# Patient Record
Sex: MALE | Race: WHITE | Employment: UNEMPLOYED | URBAN - METROPOLITAN AREA
[De-identification: names, ages, dates, MRNs, and addresses within clinical notes are randomized per-mention and may not be internally consistent; named-entity substitution may affect disease eponyms.]

---

## 2017-01-16 ENCOUNTER — TELEPHONE (OUTPATIENT)
Dept: CARDIOLOGY CLINIC | Age: 52
End: 2017-01-16

## 2017-04-10 ENCOUNTER — OFFICE VISIT (OUTPATIENT)
Dept: ORTHOPEDIC SURGERY | Age: 52
End: 2017-04-10

## 2017-04-10 VITALS — WEIGHT: 250 LBS | HEIGHT: 62 IN | BODY MASS INDEX: 46.01 KG/M2 | RESPIRATION RATE: 16 BRPM

## 2017-04-10 DIAGNOSIS — M17.11 PRIMARY OSTEOARTHRITIS OF RIGHT KNEE: ICD-10-CM

## 2017-04-10 DIAGNOSIS — M17.12 PRIMARY OSTEOARTHRITIS OF LEFT KNEE: ICD-10-CM

## 2017-04-10 DIAGNOSIS — R52 PAIN: Primary | ICD-10-CM

## 2017-04-10 PROCEDURE — 99215 OFFICE O/P EST HI 40 MIN: CPT | Performed by: ORTHOPAEDIC SURGERY

## 2017-04-10 PROCEDURE — 20610 DRAIN/INJ JOINT/BURSA W/O US: CPT | Performed by: ORTHOPAEDIC SURGERY

## 2017-04-10 ASSESSMENT — ENCOUNTER SYMPTOMS
RESPIRATORY NEGATIVE: 1
GASTROINTESTINAL NEGATIVE: 1
EYES NEGATIVE: 1

## 2017-04-26 PROBLEM — N39.0 URINARY TRACT INFECTION: Status: ACTIVE | Noted: 2017-04-26

## 2017-05-11 ENCOUNTER — OFFICE VISIT (OUTPATIENT)
Dept: ORTHOPEDIC SURGERY | Age: 52
End: 2017-05-11

## 2017-05-11 VITALS
SYSTOLIC BLOOD PRESSURE: 94 MMHG | HEART RATE: 102 BPM | WEIGHT: 240 LBS | DIASTOLIC BLOOD PRESSURE: 67 MMHG | HEIGHT: 63 IN | BODY MASS INDEX: 42.52 KG/M2

## 2017-05-11 DIAGNOSIS — R46.0 POOR HYGIENE: ICD-10-CM

## 2017-05-11 DIAGNOSIS — M17.12 PRIMARY OSTEOARTHRITIS OF LEFT KNEE: ICD-10-CM

## 2017-05-11 DIAGNOSIS — M17.11 PRIMARY OSTEOARTHRITIS OF RIGHT KNEE: ICD-10-CM

## 2017-05-11 DIAGNOSIS — M25.562 PAIN IN BOTH KNEES, UNSPECIFIED CHRONICITY: Primary | ICD-10-CM

## 2017-05-11 DIAGNOSIS — M25.561 PAIN IN BOTH KNEES, UNSPECIFIED CHRONICITY: Primary | ICD-10-CM

## 2017-05-11 PROCEDURE — 99243 OFF/OP CNSLTJ NEW/EST LOW 30: CPT | Performed by: ORTHOPAEDIC SURGERY

## 2017-05-11 PROCEDURE — 73564 X-RAY EXAM KNEE 4 OR MORE: CPT | Performed by: ORTHOPAEDIC SURGERY

## 2017-05-15 ENCOUNTER — TELEPHONE (OUTPATIENT)
Dept: CARDIOLOGY CLINIC | Age: 52
End: 2017-05-15

## 2017-05-16 ENCOUNTER — TELEPHONE (OUTPATIENT)
Dept: ORTHOPEDIC SURGERY | Age: 52
End: 2017-05-16

## 2017-05-16 ENCOUNTER — HOSPITAL ENCOUNTER (OUTPATIENT)
Dept: LAB | Age: 52
Discharge: HOME | End: 2017-05-16

## 2017-05-16 LAB
ALBUMIN SERPL-MCNC: 3.7 GM/DL (ref 3.4–5)
ANION GAP SERPL CALCULATED.3IONS-SCNC: 17 MMOL/L (ref 4–16)
APTT: 23.5 SECONDS (ref 21.2–33)
BACTERIA: NEGATIVE /HPF
BASOPHILS ABSOLUTE: 0.1 K/CU MM
BASOPHILS RELATIVE PERCENT: 0.8 % (ref 0–1)
BILIRUBIN URINE: NEGATIVE MG/DL
BLOOD, URINE: NEGATIVE
BUN BLDV-MCNC: 7 MG/DL (ref 6–23)
CALCIUM SERPL-MCNC: 9.5 MG/DL (ref 8.3–10.6)
CHLORIDE BLD-SCNC: 95 MMOL/L (ref 99–110)
CLARITY: ABNORMAL
CO2: 24 MMOL/L (ref 21–32)
COLOR: ABNORMAL
CREAT SERPL-MCNC: 0.7 MG/DL (ref 0.9–1.3)
DIFFERENTIAL TYPE: ABNORMAL
EOSINOPHILS ABSOLUTE: 0.1 K/CU MM
EOSINOPHILS RELATIVE PERCENT: 1.8 % (ref 0–3)
GFR AFRICAN AMERICAN: >60 ML/MIN/1.73M2
GFR NON-AFRICAN AMERICAN: >60 ML/MIN/1.73M2
GLUCOSE FASTING: 552 MG/DL (ref 70–99)
GLUCOSE, URINE: >500 MG/DL
HCT VFR BLD CALC: 40.5 % (ref 42–52)
HEMOGLOBIN: 13.2 GM/DL (ref 13.5–18)
IMMATURE NEUTROPHIL %: 1 % (ref 0–0.43)
INR BLD: 0.85 INDEX
KETONES, URINE: NEGATIVE MG/DL
LEUKOCYTE ESTERASE, URINE: ABNORMAL
LYMPHOCYTES ABSOLUTE: 2.7 K/CU MM
LYMPHOCYTES RELATIVE PERCENT: 34.4 % (ref 24–44)
MCH RBC QN AUTO: 26.6 PG (ref 27–31)
MCHC RBC AUTO-ENTMCNC: 32.6 % (ref 32–36)
MCV RBC AUTO: 81.5 FL (ref 78–100)
MONOCYTES ABSOLUTE: 0.5 K/CU MM
MONOCYTES RELATIVE PERCENT: 6.1 % (ref 0–4)
NITRITE URINE, QUANTITATIVE: NEGATIVE
NUCLEATED RBC %: 0 %
PDW BLD-RTO: 13.3 % (ref 11.7–14.9)
PH, URINE: 6 (ref 5–8)
PLATELET # BLD: 388 K/CU MM (ref 140–440)
PMV BLD AUTO: 9.4 FL (ref 7.5–11.1)
POTASSIUM SERPL-SCNC: 3.8 MMOL/L (ref 3.5–5.1)
PROTEIN UA: NEGATIVE MG/DL
PROTHROMBIN TIME: 9.6 SECONDS (ref 9.12–12.5)
RBC # BLD: 4.97 M/CU MM (ref 4.6–6.2)
RBC URINE: 3 /HPF (ref 0–3)
SEGMENTED NEUTROPHILS ABSOLUTE COUNT: 4.4 K/CU MM
SEGMENTED NEUTROPHILS RELATIVE PERCENT: 55.9 % (ref 36–66)
SODIUM BLD-SCNC: 136 MMOL/L (ref 135–145)
SPECIFIC GRAVITY UA: 1.02 (ref 1–1.03)
SQUAMOUS EPITHELIAL: 2 /HPF
TOTAL IMMATURE NEUTOROPHIL: 0.08 K/CU MM
TOTAL NUCLEATED RBC: 0 K/CU MM
TRANSFERRIN: 225.8 MG/DL (ref 200–360)
UROBILINOGEN, URINE: NORMAL MG/DL (ref 0.2–1)
WBC # BLD: 7.9 K/CU MM (ref 4–10.5)
WBC CLUMP: ABNORMAL /HPF
WBC UA: 133 /HPF (ref 0–2)
YEAST: ABNORMAL /HPF

## 2017-05-18 LAB
CULTURE: NORMAL
ESTIMATED AVERAGE GLUCOSE: 395 MG/DL
HBA1C MFR BLD: 15.4 % (ref 4.2–6.3)
ORGANISM: NORMAL
REPORT STATUS: NORMAL
REQUEST PROBLEM: NORMAL
SPECIMEN: NORMAL
TOTAL COLONY COUNT: NORMAL

## 2017-05-24 ENCOUNTER — OFFICE VISIT (OUTPATIENT)
Dept: CARDIOLOGY CLINIC | Age: 52
End: 2017-05-24

## 2017-05-24 VITALS
SYSTOLIC BLOOD PRESSURE: 114 MMHG | HEART RATE: 80 BPM | WEIGHT: 262.6 LBS | HEIGHT: 64 IN | BODY MASS INDEX: 44.83 KG/M2 | DIASTOLIC BLOOD PRESSURE: 76 MMHG

## 2017-05-24 DIAGNOSIS — I10 ESSENTIAL HYPERTENSION: ICD-10-CM

## 2017-05-24 DIAGNOSIS — I21.4 NSTEMI (NON-ST ELEVATED MYOCARDIAL INFARCTION) (HCC): Primary | ICD-10-CM

## 2017-05-24 PROCEDURE — 99214 OFFICE O/P EST MOD 30 MIN: CPT | Performed by: INTERNAL MEDICINE

## 2017-06-08 ENCOUNTER — OFFICE VISIT (OUTPATIENT)
Dept: ORTHOPEDIC SURGERY | Age: 52
End: 2017-06-08

## 2017-06-08 VITALS — HEIGHT: 64 IN | WEIGHT: 262.57 LBS | BODY MASS INDEX: 44.83 KG/M2

## 2017-06-08 DIAGNOSIS — M17.11 PRIMARY OSTEOARTHRITIS OF RIGHT KNEE: ICD-10-CM

## 2017-06-08 DIAGNOSIS — M17.12 PRIMARY OSTEOARTHRITIS OF LEFT KNEE: Primary | ICD-10-CM

## 2017-06-08 PROCEDURE — 99213 OFFICE O/P EST LOW 20 MIN: CPT | Performed by: ORTHOPAEDIC SURGERY

## 2017-06-08 PROCEDURE — L1810 KO ELASTIC WITH JOINTS: HCPCS | Performed by: ORTHOPAEDIC SURGERY

## 2017-07-12 ENCOUNTER — HOSPITAL ENCOUNTER (OUTPATIENT)
Dept: PHYSICAL THERAPY | Age: 52
Discharge: HOME | End: 2017-07-31
Attending: FAMILY MEDICINE | Admitting: FAMILY MEDICINE

## 2017-07-12 ASSESSMENT — PAIN DESCRIPTION - DESCRIPTORS: DESCRIPTORS: ACHING;PRESSURE

## 2017-07-12 ASSESSMENT — PAIN DESCRIPTION - FREQUENCY: FREQUENCY: INTERMITTENT

## 2017-07-12 ASSESSMENT — PAIN DESCRIPTION - ORIENTATION: ORIENTATION: RIGHT;LEFT

## 2017-07-12 ASSESSMENT — PAIN DESCRIPTION - PAIN TYPE: TYPE: CHRONIC PAIN

## 2017-08-01 ENCOUNTER — HOSPITAL ENCOUNTER (OUTPATIENT)
Dept: OTHER | Age: 52
Discharge: HOME | End: 2017-08-17
Attending: FAMILY MEDICINE | Admitting: FAMILY MEDICINE

## 2017-08-21 ENCOUNTER — HOSPITAL ENCOUNTER (OUTPATIENT)
Dept: LAB | Age: 52
Discharge: HOME | End: 2017-08-21
Attending: ORTHOPAEDIC SURGERY | Admitting: ORTHOPAEDIC SURGERY

## 2017-08-21 LAB
ALBUMIN SERPL-MCNC: 3.7 GM/DL (ref 3.4–5)
ANION GAP SERPL CALCULATED.3IONS-SCNC: 15 MMOL/L (ref 4–16)
APTT: 27.1 SECONDS (ref 21.2–33)
BACTERIA: ABNORMAL /HPF
BASOPHILS ABSOLUTE: 0.1 K/CU MM
BASOPHILS RELATIVE PERCENT: 0.5 % (ref 0–1)
BILIRUBIN URINE: NEGATIVE MG/DL
BLOOD, URINE: ABNORMAL
BUN BLDV-MCNC: 11 MG/DL (ref 6–23)
CALCIUM SERPL-MCNC: 8.6 MG/DL (ref 8.3–10.6)
CHLORIDE BLD-SCNC: 102 MMOL/L (ref 99–110)
CLARITY: ABNORMAL
CO2: 23 MMOL/L (ref 21–32)
COLOR: YELLOW
CREAT SERPL-MCNC: 0.6 MG/DL (ref 0.9–1.3)
DIFFERENTIAL TYPE: ABNORMAL
EOSINOPHILS ABSOLUTE: 0.3 K/CU MM
EOSINOPHILS RELATIVE PERCENT: 2.5 % (ref 0–3)
ESTIMATED AVERAGE GLUCOSE: 154 MG/DL
GFR AFRICAN AMERICAN: >60 ML/MIN/1.73M2
GFR NON-AFRICAN AMERICAN: >60 ML/MIN/1.73M2
GLUCOSE BLD-MCNC: 91 MG/DL (ref 70–140)
GLUCOSE, URINE: NEGATIVE MG/DL
HBA1C MFR BLD: 7 % (ref 4.2–6.3)
HCT VFR BLD CALC: 35.2 % (ref 42–52)
HEMOGLOBIN: 11.5 GM/DL (ref 13.5–18)
IMMATURE NEUTROPHIL %: 0.8 % (ref 0–0.43)
INR BLD: 0.99 INDEX
KETONES, URINE: ABNORMAL MG/DL
LEUKOCYTE ESTERASE, URINE: ABNORMAL
LYMPHOCYTES ABSOLUTE: 3.2 K/CU MM
LYMPHOCYTES RELATIVE PERCENT: 30.3 % (ref 24–44)
MCH RBC QN AUTO: 27.5 PG (ref 27–31)
MCHC RBC AUTO-ENTMCNC: 32.7 % (ref 32–36)
MCV RBC AUTO: 84.2 FL (ref 78–100)
MONOCYTES ABSOLUTE: 0.6 K/CU MM
MONOCYTES RELATIVE PERCENT: 5.3 % (ref 0–4)
MUCUS: ABNORMAL HPF
NITRITE URINE, QUANTITATIVE: NEGATIVE
NUCLEATED RBC %: 0 %
PDW BLD-RTO: 14.5 % (ref 11.7–14.9)
PH, URINE: 6 (ref 5–8)
PLATELET # BLD: 339 K/CU MM (ref 140–440)
PMV BLD AUTO: 8.2 FL (ref 7.5–11.1)
POTASSIUM SERPL-SCNC: 3.2 MMOL/L (ref 3.5–5.1)
PROTEIN UA: 30 MG/DL
PROTHROMBIN TIME: 11.3 SECONDS (ref 9.12–12.5)
RBC # BLD: 4.18 M/CU MM (ref 4.6–6.2)
RBC URINE: 3 /HPF (ref 0–3)
SEGMENTED NEUTROPHILS ABSOLUTE COUNT: 6.5 K/CU MM
SEGMENTED NEUTROPHILS RELATIVE PERCENT: 60.6 % (ref 36–66)
SODIUM BLD-SCNC: 140 MMOL/L (ref 135–145)
SPECIFIC GRAVITY UA: 1.02 (ref 1–1.03)
SQUAMOUS EPITHELIAL: 5 /HPF
TOTAL IMMATURE NEUTOROPHIL: 0.09 K/CU MM
TOTAL NUCLEATED RBC: 0 K/CU MM
TRANSFERRIN: 220.5 MG/DL (ref 200–360)
TRICHOMONAS: ABNORMAL /HPF
UROBILINOGEN, URINE: NORMAL MG/DL (ref 0.2–1)
WBC # BLD: 10.7 K/CU MM (ref 4–10.5)
WBC CLUMP: ABNORMAL /HPF
WBC UA: 128 /HPF (ref 0–2)

## 2017-08-24 LAB
CULTURE: NORMAL
ORGANISM: NORMAL
REPORT STATUS: NORMAL
REQUEST PROBLEM: NORMAL
SPECIMEN: NORMAL
TOTAL COLONY COUNT: NORMAL

## 2017-08-25 RX ORDER — AMOXICILLIN AND CLAVULANATE POTASSIUM 875; 125 MG/1; MG/1
TABLET, FILM COATED ORAL
Qty: 20 TABLET | Refills: 0 | Status: ON HOLD | OUTPATIENT
Start: 2017-08-25 | End: 2017-11-27 | Stop reason: HOSPADM

## 2017-10-19 ENCOUNTER — OFFICE VISIT (OUTPATIENT)
Dept: ORTHOPEDIC SURGERY | Age: 52
End: 2017-10-19

## 2017-10-19 VITALS
SYSTOLIC BLOOD PRESSURE: 150 MMHG | HEIGHT: 63 IN | BODY MASS INDEX: 46.07 KG/M2 | HEART RATE: 97 BPM | DIASTOLIC BLOOD PRESSURE: 66 MMHG | WEIGHT: 260 LBS

## 2017-10-19 DIAGNOSIS — M17.12 PRIMARY OSTEOARTHRITIS OF LEFT KNEE: Primary | ICD-10-CM

## 2017-10-19 DIAGNOSIS — M25.562 PAIN IN BOTH KNEES, UNSPECIFIED CHRONICITY: ICD-10-CM

## 2017-10-19 DIAGNOSIS — M25.561 PAIN IN BOTH KNEES, UNSPECIFIED CHRONICITY: ICD-10-CM

## 2017-10-19 DIAGNOSIS — M17.11 PRIMARY OSTEOARTHRITIS OF RIGHT KNEE: ICD-10-CM

## 2017-10-19 PROCEDURE — L1830 KO IMMOB CANVAS LONG PRE OTS: HCPCS | Performed by: PHYSICIAN ASSISTANT

## 2017-10-19 PROCEDURE — 99214 OFFICE O/P EST MOD 30 MIN: CPT | Performed by: PHYSICIAN ASSISTANT

## 2017-10-19 PROCEDURE — G8598 ASA/ANTIPLAT THER USED: HCPCS | Performed by: PHYSICIAN ASSISTANT

## 2017-10-19 PROCEDURE — G8427 DOCREV CUR MEDS BY ELIG CLIN: HCPCS | Performed by: PHYSICIAN ASSISTANT

## 2017-10-19 PROCEDURE — 4004F PT TOBACCO SCREEN RCVD TLK: CPT | Performed by: PHYSICIAN ASSISTANT

## 2017-10-19 PROCEDURE — 3017F COLORECTAL CA SCREEN DOC REV: CPT | Performed by: PHYSICIAN ASSISTANT

## 2017-10-19 PROCEDURE — G8484 FLU IMMUNIZE NO ADMIN: HCPCS | Performed by: PHYSICIAN ASSISTANT

## 2017-10-19 PROCEDURE — G8417 CALC BMI ABV UP PARAM F/U: HCPCS | Performed by: PHYSICIAN ASSISTANT

## 2017-10-19 NOTE — PROGRESS NOTES
Chief Complaint    Knee Pain (bilat knee wants to discuss sx)      History of Present Illness:  Gavi Mark is a 50 y.o. male returns today for follow-up on his bilateral knee osteoarthritis. He has severe osteoarthritis of both knees and had several discussions with this patient seemed regarding the importance of weight loss and diabetic control. He's been working with his pharmacist up in [facility] and is unable to take his hemoglobin A1c from 14.0 down to 7.1. He is also continuing to work on weight loss and has lost approximately 15 pounds. However, he continues to have chronic pain in both knees, left greater than right. Weightbearing activities such as walking and standing increases level pain and discomfort. He is using a walker and wears bilateral hinged knee braces. In addition to be in a poorly controlled diabetic she is also on Plavix for cardiac issues and takes hydrocodone one every 6 hours for chronic pain. The pain does severely affecting his quality of life and cause sleep disturbances at night. PAIN:   Pain Assessment  Location of Pain: Knee  Location Modifiers: Right, Left  Severity of Pain: 8  Frequency of Pain: Constant  Aggravating Factors: Walking, Standing, Stairs, Other (Comment)  Limiting Behavior: Yes  Result of Injury: No  Work-Related Injury: No  Are there other pain locations you wish to document?: No    The patients chronic pain has gradually worsening over the last 5 years. The patient rates pain on a level of 8/10. Pain impacts patients ability to drive, sleep and climb stairs. Medical History:  Patient's medications, allergies, past medical, surgical, social and family histories were reviewed and updated as appropriate. Medication Management  Patient has been treated with NSAIDs, Steroids and Analgesics with Minimal relief for 5 years.     Review of Systems:  Relevant review of systems reviewed and available in the patient's chart    Vital positioning and alignment of anatomical landmarks. 2. Trimming of straps and panels. Reassemble orthosis to specifically fit patient. The patient was educated and fit by a healthcare professional with expert knowledge and specialization in brace application while under the direct supervision of the treating physician. Verbal and written instructions for the use of and application of this item were provided. They were instructed to contact the office immediately should the brace result in increased pain, decreased sensation, increased swelling or worsening of the condition. Treatment Plan: Today we've gone over the patient's diagnosis and the recommendations for treatment. He does have severe osteoarthritis of both knees and has been able to get his diabetes under control. He has tried aggressive conservative treatment as mentioned above. He is going to continue to work with his pharmacist to continue to get his hemoglobin A1c below 7. We will be repeating all of the necessary blood works including the hemoglobin A1c to make sure he is continuing to trend downwards. He would like for him to continue to work on weight loss in the meantime as well. He understands that he still carries a high risk with surgery with his cardiovascular history, diabetes, obesity, and overall poor hygiene. We would recommend proceeding with a left total knee replacement with computer navigation. We discussed the risk, benefits, and potential complications of total knee replacement arthroplasty surgery. The patient voiced their understanding to concerns that include infection, deep vein thrombosis, neurological injury, and delayed rehabilitation. The patient also realizes that there are concerns regarding the potential need for manipulation under anesthesia if range of motion proves to be problematic.  The patient also understands that there is always a chance of dystrophy and anesthetic complications that would

## 2017-10-19 NOTE — LETTER
CONSENT TO SURGICAL OR MEDICAL PROCEDURE    PATIENTS NAMES: Regulo Jones 1965  50 y.o.      [phone] (home)   DATE/TIME: 10/19/2017 1:40 PM    1) I consent that Dr. Caro Rodriguez perform one or more surgical and or medical procedures on the above named patient at: [address] [place] [address]/[place] [address] to treat the condition(s) which appear indicated by the diagnostic studies already preformed. I have been told in general terms the nature and purpose of the procedure(s) and what the procedure(s) is/are expected to accomplish. They procedure(s) are as follows:   LEFT TOTAL KNEE REPLACEMENT WITH COMPUTER NAVIGATION     2) It has been explained to me by the informing physician that during the course of any surgical or medical procedure unforeseen condition(s) may be revealed that necessitate an extension of the original procedure(s) or a different procedure(s) than set forth in Paragraph 1. I therefore consent that the above named physician perform such additional or different procedure(s) as are necessary or desirable in the exercise of his professional judgement. 3) I have been made aware by the informing physician of certain reasonably known risks that are associated with the procedure(s) set forth in Paragraph 1.  I understand the reasonably known risk to be: Including but not limited to: CVA, infection, M.I., Phlebitis, Cardiac Arrest and Pulmonary Embolism, Loss of Circulation, Nerve Injury, Delayed Healing, Recurrence, Loss of extremity/digit, R.S.D., Screw breakage, Arthritis, Pain, Swelling, Stiffness, Failure of Prothesis, Fracture, Leg length discrepancy, Wound complication/non-healing, need for further surgery and persistent pain.   4) I have also been informed by the informing physician that there are other risks, from both known and unknown causes, that are attendant to the

## 2017-10-19 NOTE — LETTER
Attention    These are medical screening labs, not pre-admission surgery labs. Via Tesfaye Saez M.D.  [phone]  [facility], [address] [place] [address]    Date:  10/19/2017    Name: Oswald Stoner    : 1965    Please take this form with you.       THE FOLLOWING LABS NEED TO BE COMPLETED:    _x__UA  _x__URINE C/S (THIS NEEDS TO BE DONE EVEN IF THE UA IS NORMAL)  _x__CBC W/ DIFF  _x__PT/INR  _x__PTT  _x__TRANSFERRIN  _x__ALBUMIN  _x__CHEM 7  _x__HEMAGLOBIN A1-C  ____OTHER: _____________________________________________                         Diagnosis:  LEFT KNEE OSTEOARTHRITIS            RT KNEE OA M17.11  LT KNEE OA M17.12         RT HIP OA  M16.11     LT HIP OA M16.12             Z01.812  (Pre-op examination code)      10/19/2017 1:40 PM  Nesha Avendaño PA-C

## 2017-10-30 ENCOUNTER — HOSPITAL ENCOUNTER (OUTPATIENT)
Dept: LAB | Age: 52
Discharge: HOME | End: 2017-10-30
Attending: PHYSICIAN ASSISTANT | Admitting: PHYSICIAN ASSISTANT

## 2017-10-30 ENCOUNTER — HOSPITAL ENCOUNTER (OUTPATIENT)
Dept: GENERAL RADIOLOGY | Age: 52
Discharge: HOME | End: 2017-10-30
Attending: FAMILY MEDICINE | Admitting: FAMILY MEDICINE

## 2017-10-30 DIAGNOSIS — T14.90XA INHALATION INJURY: ICD-10-CM

## 2017-10-30 LAB
ALBUMIN SERPL-MCNC: 3.9 GM/DL (ref 3.4–5)
ANION GAP SERPL CALCULATED.3IONS-SCNC: 14 MMOL/L (ref 4–16)
APTT: 24.9 SECONDS (ref 21.2–33)
BACTERIA: ABNORMAL /HPF
BASOPHILS ABSOLUTE: 0.1 K/CU MM
BASOPHILS RELATIVE PERCENT: 0.5 % (ref 0–1)
BILIRUBIN URINE: NEGATIVE MG/DL
BLOOD, URINE: ABNORMAL
BUN BLDV-MCNC: 9 MG/DL (ref 6–23)
CALCIUM SERPL-MCNC: 9.1 MG/DL (ref 8.3–10.6)
CHLORIDE BLD-SCNC: 94 MMOL/L (ref 99–110)
CLARITY: ABNORMAL
CO2: 24 MMOL/L (ref 21–32)
COLOR: YELLOW
CREAT SERPL-MCNC: 0.6 MG/DL (ref 0.9–1.3)
DIFFERENTIAL TYPE: ABNORMAL
EOSINOPHILS ABSOLUTE: 0.4 K/CU MM
EOSINOPHILS RELATIVE PERCENT: 3 % (ref 0–3)
ESTIMATED AVERAGE GLUCOSE: 235 MG/DL
GFR AFRICAN AMERICAN: >60 ML/MIN/1.73M2
GFR NON-AFRICAN AMERICAN: >60 ML/MIN/1.73M2
GLUCOSE FASTING: 565 MG/DL (ref 70–99)
GLUCOSE, URINE: >500 MG/DL
HBA1C MFR BLD: 9.8 % (ref 4.2–6.3)
HCT VFR BLD CALC: 41.2 % (ref 42–52)
HEMOGLOBIN: 13.9 GM/DL (ref 13.5–18)
IMMATURE NEUTROPHIL %: 0.6 % (ref 0–0.43)
INR BLD: 0.94 INDEX
KETONES, URINE: NEGATIVE MG/DL
LEUKOCYTE ESTERASE, URINE: ABNORMAL
LYMPHOCYTES ABSOLUTE: 2.8 K/CU MM
LYMPHOCYTES RELATIVE PERCENT: 24.7 % (ref 24–44)
MCH RBC QN AUTO: 27 PG (ref 27–31)
MCHC RBC AUTO-ENTMCNC: 33.7 % (ref 32–36)
MCV RBC AUTO: 80 FL (ref 78–100)
MONOCYTES ABSOLUTE: 0.5 K/CU MM
MONOCYTES RELATIVE PERCENT: 4.5 % (ref 0–4)
NITRITE URINE, QUANTITATIVE: NEGATIVE
NUCLEATED RBC %: 0 %
PDW BLD-RTO: 13.3 % (ref 11.7–14.9)
PH, URINE: 6 (ref 5–8)
PLATELET # BLD: 332 K/CU MM (ref 140–440)
PMV BLD AUTO: 8.5 FL (ref 7.5–11.1)
POTASSIUM SERPL-SCNC: 3.6 MMOL/L (ref 3.5–5.1)
PROTEIN UA: NEGATIVE MG/DL
PROTHROMBIN TIME: 10.7 SECONDS (ref 9.12–12.5)
RBC # BLD: 5.15 M/CU MM (ref 4.6–6.2)
RBC URINE: ABNORMAL /HPF (ref 0–3)
SEGMENTED NEUTROPHILS ABSOLUTE COUNT: 7.7 K/CU MM
SEGMENTED NEUTROPHILS RELATIVE PERCENT: 66.7 % (ref 36–66)
SODIUM BLD-SCNC: 132 MMOL/L (ref 135–145)
SPECIFIC GRAVITY UA: 1.02 (ref 1–1.03)
SQUAMOUS EPITHELIAL: 1 /HPF
TOTAL IMMATURE NEUTOROPHIL: 0.07 K/CU MM
TOTAL NUCLEATED RBC: 0 K/CU MM
TRANSFERRIN: 236 MG/DL (ref 200–360)
TRICHOMONAS: ABNORMAL /HPF
UROBILINOGEN, URINE: NORMAL MG/DL (ref 0.2–1)
WBC # BLD: 11.5 K/CU MM (ref 4–10.5)
WBC CLUMP: ABNORMAL /HPF
WBC UA: 228 /HPF (ref 0–2)
YEAST: ABNORMAL /HPF

## 2017-12-06 ENCOUNTER — TELEPHONE (OUTPATIENT)
Dept: CARDIOLOGY CLINIC | Age: 52
End: 2017-12-06

## 2017-12-11 NOTE — TELEPHONE ENCOUNTER
Returned call to [facility] and spoke with Kathya Singh. Barney is still off of work. Explained to Kathya Singh how to [place] monitor and loop recorder. She voiced understanding and said that she would send transmission once patient returned to his room.

## 2017-12-15 PROBLEM — R53.1 WEAK: Status: ACTIVE | Noted: 2017-12-15

## 2018-08-05 PROBLEM — N39.0 UTI (URINARY TRACT INFECTION) WITH PYURIA: Status: ACTIVE | Noted: 2018-08-05

## 2018-08-05 PROBLEM — Z79.4 TYPE 2 DIABETES MELLITUS WITH HYPERGLYCEMIA, WITH LONG-TERM CURRENT USE OF INSULIN (HCC): Status: ACTIVE | Noted: 2018-08-05
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
## 2018-08-05 PROBLEM — E11.65 TYPE 2 DIABETES MELLITUS WITH HYPERGLYCEMIA, WITH LONG-TERM CURRENT USE OF INSULIN (HCC): Status: ACTIVE | Noted: 2018-08-05

## 2018-08-05 PROBLEM — R79.89 ELEVATED TROPONIN LEVEL: Status: ACTIVE | Noted: 2018-08-05

## 2018-08-05 PROBLEM — R77.8 ELEVATED TROPONIN LEVEL: Status: ACTIVE | Noted: 2018-08-05
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
## 2018-08-15 ENCOUNTER — OFFICE VISIT (OUTPATIENT)
Dept: CARDIOLOGY CLINIC | Age: 53
End: 2018-08-15

## 2018-08-15 VITALS
HEIGHT: 63 IN | HEART RATE: 80 BPM | SYSTOLIC BLOOD PRESSURE: 120 MMHG | DIASTOLIC BLOOD PRESSURE: 70 MMHG | WEIGHT: 315 LBS | BODY MASS INDEX: 55.81 KG/M2

## 2018-08-15 DIAGNOSIS — I73.9 CLAUDICATION (HCC): ICD-10-CM

## 2018-08-15 DIAGNOSIS — I25.10 CORONARY ARTERY DISEASE INVOLVING NATIVE CORONARY ARTERY OF NATIVE HEART WITHOUT ANGINA PECTORIS: Primary | ICD-10-CM

## 2018-08-15 DIAGNOSIS — E78.5 HYPERLIPIDEMIA, UNSPECIFIED HYPERLIPIDEMIA TYPE: ICD-10-CM

## 2018-08-15 DIAGNOSIS — I10 ESSENTIAL HYPERTENSION: ICD-10-CM

## 2018-08-15 DIAGNOSIS — E11.65 TYPE 2 DIABETES MELLITUS WITH HYPERGLYCEMIA, WITH LONG-TERM CURRENT USE OF INSULIN (HCC): ICD-10-CM

## 2018-08-15 DIAGNOSIS — I20.0 ANGINA PECTORIS, UNSTABLE (HCC): ICD-10-CM

## 2018-08-15 DIAGNOSIS — Z79.4 TYPE 2 DIABETES MELLITUS WITH HYPERGLYCEMIA, WITH LONG-TERM CURRENT USE OF INSULIN (HCC): ICD-10-CM

## 2018-08-15 PROCEDURE — 99214 OFFICE O/P EST MOD 30 MIN: CPT | Performed by: NURSE PRACTITIONER

## 2018-08-15 NOTE — PROGRESS NOTES
CARDIOLOGY  NOTE      8/15/2018    RE: Jessie Tate  (1965)                               TO:  Dr. Bernadine Garcia MD  The primary cardiologist is     Thank you for involving me in taking care of your  patient Jessie Tate, who is a  50y.o. year old male with past medical  history of Angina, CAD, CVA, HTN, Claudication, hyperlipidemia, and DM. He is seen today for a follow up from [facility] where it was noted that he had elevated troponin. He during this visit he denies any chest pain. He denies any sob at this time. He is sitting in a wheelchair today. He notes he is fatigued and not sleeping well.      Vitals:    08/15/18 1022   BP: 120/70   Pulse: 80       Current Outpatient Prescriptions   Medication Sig Dispense Refill    apixaban (ELIQUIS) 5 MG TABS tablet Take by mouth 2 times daily      aspirin 81 MG chewable tablet Take 4 tablets by mouth daily 30 tablet 3    phenytoin (PHENYTEK) 300 MG ER capsule Take 1 capsule by mouth daily (Patient taking differently: Take 600 mg by mouth daily ) 60 capsule 3    insulin aspart (NOVOLOG) 100 UNIT/ML injection vial Inject into the skin 3 times daily (before meals)      oxybutynin (DITROPAN) 5 MG tablet Take 5 mg by mouth 3 times daily      metoprolol succinate (TOPROL XL) 25 MG extended release tablet Take 25 mg by mouth daily      acetaminophen (TYLENOL) 325 MG tablet Take 2 tablets by mouth every 4 hours as needed for Pain 120 tablet 3    FLUoxetine (PROZAC) 20 MG capsule Take 1 capsule by mouth daily 30 capsule 3    insulin glargine (LANTUS) 100 UNIT/ML injection vial Inject 10 Units into the skin nightly 1 vial 3    folic acid-pyridoxine-cyancobalamin (FOLTX) 1.13-25-2 MG TABS Take 1 tablet by mouth daily 30 tablet     docusate (COLACE, DULCOLAX) 100 MG CAPS Take 100 mg by mouth 2 times daily      ranitidine (ZANTAC) 150 MG capsule Take 150 mg by mouth 2 times daily      cyclobenzaprine (FLEXERIL) 10 MG tablet Take 10 mg by mouth 3 times daily as needed for Muscle spasms      lisinopril (PRINIVIL;ZESTRIL) 10 MG tablet Take 10 mg by mouth daily      metFORMIN (GLUCOPHAGE) 1000 MG tablet TAKE 1 TABLET BY MOUTH 2 TIMES DAILY (WITH MEALS) 60 tablet 3    atorvastatin (LIPITOR) 80 MG tablet Take 1 tablet by mouth nightly 30 tablet 6    nitroGLYCERIN (NITROSTAT) 0.4 MG SL tablet Place 1 tablet under the tongue every 5 minutes as needed for Chest pain. 25 tablet 0     No current facility-administered medications for this visit. Allergies: Latex  Past Medical History:   Diagnosis Date    CAD (coronary artery disease) 3/19/2014    Diabetes mellitus ([facility]     New diagnosis 3/19/14    Family history of early CAD     Father-age 42's    H/O cardiovascular stress test 5/9/2014    EF 62% normal study    H/O echocardiogram 7/28/15    EF 55-60%. Mild mitral and tricuspid insuff.  H/O echocardiogram 05/09/2018    EF40-50% mild phtn, , TR    History of cardiac catheterization 3/19/2014    3/2014-LAD 99% prox,LAD 80% mid,CX dominant with OM1 50% prox, PDA 80%, RCA 80% mid-PTCA with 1 stent to LAD prox and 2 stents to LAD mid. CX stent in 2nd sitting as OP-Dr Alvin Reilly    History of echocardiogram 3/19/2014    3/2014-Mild LVH. Normal global and regional LVSF. EF 60%.  Mild MR/TR;    Hx of Doppler ultrasound 5/09/14    Peripheral- Normal    Hyperlipemia     Loss of consciousness ([facility] 3/18/2014    Multiple lacunar infarcts (HCC)     chronic-basal ganglia region bilaterally    NSTEMI (non-ST elevated myocardial infarction) ([facility] 3/19/2014    S/P primary angioplasty with coronary stent 3/19/2014    3/2014-PTCA with 3 stents to LAD;    Seizures [facility])      Past Surgical History:   Procedure Laterality Date    CORONARY ANGIOPLASTY WITH STENT PLACEMENT  3/19/2014    3/2014-PTCA with 1 stent to LAD prox and 2 stents to LAD mid- Dr Yousif Vázquez

## 2018-09-01 PROBLEM — N20.0 STAGHORN CALCULUS: Status: ACTIVE | Noted: 2018-09-01

## 2018-09-01 PROBLEM — N20.0 STAGHORN RENAL CALCULUS: Status: ACTIVE | Noted: 2018-09-01

## 2018-09-06 ENCOUNTER — TELEPHONE (OUTPATIENT)
Dept: CARDIOLOGY CLINIC | Age: 53
End: 2018-09-06

## 2018-09-06 NOTE — TELEPHONE ENCOUNTER
[address] [facility] calling to schedule appt with Dr Rylee Espinal. I explained the protocol of Vivienne Hernandes calling them with the appointment date and time. They verbalized their understanding. They can be reached at [phone] as he is a resident at [facility] currently.

## 2018-09-13 ENCOUNTER — TELEPHONE (OUTPATIENT)
Dept: CARDIOLOGY CLINIC | Age: 53
End: 2018-09-13

## 2018-09-26 PROBLEM — N39.0 URINARY TRACT INFECTION: Status: RESOLVED | Noted: 2017-04-26 | Resolved: 2018-09-26

## 2018-11-19 ENCOUNTER — APPOINTMENT (OUTPATIENT)
Dept: CT IMAGING | Age: 53
End: 2018-11-19
Payer: MEDICAID

## 2018-11-19 ENCOUNTER — HOSPITAL ENCOUNTER (EMERGENCY)
Age: 53
Discharge: HOME | End: 2018-11-19
Payer: MEDICAID

## 2018-11-19 VITALS
OXYGEN SATURATION: 97 % | RESPIRATION RATE: 18 BRPM | HEIGHT: 63 IN | SYSTOLIC BLOOD PRESSURE: 138 MMHG | TEMPERATURE: 99.4 F | HEART RATE: 76 BPM | DIASTOLIC BLOOD PRESSURE: 86 MMHG | WEIGHT: 315 LBS | BODY MASS INDEX: 55.81 KG/M2

## 2018-11-19 DIAGNOSIS — G40.919 BREAKTHROUGH SEIZURE (HCC): Primary | ICD-10-CM

## 2018-11-19 LAB
ALBUMIN SERPL-MCNC: 3.4 GM/DL (ref 3.4–5)
ALP BLD-CCNC: 175 IU/L (ref 40–129)
ALT SERPL-CCNC: 18 U/L (ref 10–40)
ANION GAP SERPL CALCULATED.3IONS-SCNC: 9 MMOL/L (ref 4–16)
AST SERPL-CCNC: 13 IU/L (ref 15–37)
BASOPHILS ABSOLUTE: 0.1 K/CU MM
BASOPHILS RELATIVE PERCENT: 0.4 % (ref 0–1)
BILIRUB SERPL-MCNC: 0.1 MG/DL (ref 0–1)
BUN BLDV-MCNC: 14 MG/DL (ref 6–23)
CALCIUM SERPL-MCNC: 8.7 MG/DL (ref 8.3–10.6)
CHLORIDE BLD-SCNC: 102 MMOL/L (ref 99–110)
CO2: 26 MMOL/L (ref 21–32)
CREAT SERPL-MCNC: 0.5 MG/DL (ref 0.9–1.3)
DIFFERENTIAL TYPE: ABNORMAL
EOSINOPHILS ABSOLUTE: 0.3 K/CU MM
EOSINOPHILS RELATIVE PERCENT: 2.7 % (ref 0–3)
GFR AFRICAN AMERICAN: >60 ML/MIN/1.73M2
GFR NON-AFRICAN AMERICAN: >60 ML/MIN/1.73M2
GLUCOSE BLD-MCNC: 117 MG/DL (ref 70–99)
HCT VFR BLD CALC: 34.9 % (ref 42–52)
HEMOGLOBIN: 11.5 GM/DL (ref 13.5–18)
IMMATURE NEUTROPHIL %: 0.9 % (ref 0–0.43)
LYMPHOCYTES ABSOLUTE: 3.4 K/CU MM
LYMPHOCYTES RELATIVE PERCENT: 29.1 % (ref 24–44)
MCH RBC QN AUTO: 27.8 PG (ref 27–31)
MCHC RBC AUTO-ENTMCNC: 33 % (ref 32–36)
MCV RBC AUTO: 84.3 FL (ref 78–100)
MONOCYTES ABSOLUTE: 0.8 K/CU MM
MONOCYTES RELATIVE PERCENT: 6.7 % (ref 0–4)
NUCLEATED RBC %: 0 %
PDW BLD-RTO: 15.5 % (ref 11.7–14.9)
PHENYTOIN LEVEL: 7 UG/ML (ref 10–20)
PLATELET # BLD: 307 K/CU MM (ref 140–440)
PMV BLD AUTO: 8.5 FL (ref 7.5–11.1)
POTASSIUM SERPL-SCNC: 3.8 MMOL/L (ref 3.5–5.1)
RBC # BLD: 4.14 M/CU MM (ref 4.6–6.2)
SEGMENTED NEUTROPHILS ABSOLUTE COUNT: 7 K/CU MM
SEGMENTED NEUTROPHILS RELATIVE PERCENT: 60.2 % (ref 36–66)
SODIUM BLD-SCNC: 137 MMOL/L (ref 135–145)
TOTAL IMMATURE NEUTOROPHIL: 0.1 K/CU MM
TOTAL NUCLEATED RBC: 0 K/CU MM
TOTAL PROTEIN: 6.9 GM/DL (ref 6.4–8.2)
WBC # BLD: 11.6 K/CU MM (ref 4–10.5)

## 2018-11-19 PROCEDURE — 85025 COMPLETE CBC W/AUTO DIFF WBC: CPT

## 2018-11-19 PROCEDURE — 2580000003 HC RX 258: Performed by: PHYSICIAN ASSISTANT

## 2018-11-19 PROCEDURE — 99284 EMERGENCY DEPT VISIT MOD MDM: CPT

## 2018-11-19 PROCEDURE — 80185 ASSAY OF PHENYTOIN TOTAL: CPT

## 2018-11-19 PROCEDURE — 96365 THER/PROPH/DIAG IV INF INIT: CPT

## 2018-11-19 PROCEDURE — 70450 CT HEAD/BRAIN W/O DYE: CPT

## 2018-11-19 PROCEDURE — 80053 COMPREHEN METABOLIC PANEL: CPT

## 2018-11-19 PROCEDURE — 6360000002 HC RX W HCPCS: Performed by: PHYSICIAN ASSISTANT

## 2018-11-19 RX ORDER — VITAMIN B COMPLEX
1 CAPSULE ORAL DAILY
COMMUNITY

## 2018-11-19 RX ORDER — LORAZEPAM 2 MG/ML
1 INJECTION INTRAMUSCULAR EVERY 4 HOURS
COMMUNITY

## 2018-11-19 RX ORDER — GABAPENTIN 600 MG/1
600 TABLET ORAL 2 TIMES DAILY
COMMUNITY
End: 2019-02-08 | Stop reason: ALTCHOICE

## 2018-11-19 RX ORDER — FOLIC ACID 1 MG/1
1 TABLET ORAL DAILY
Status: ON HOLD | COMMUNITY
End: 2019-05-23 | Stop reason: HOSPADM

## 2018-11-19 RX ORDER — PHENYTOIN SODIUM 100 MG/1
300 CAPSULE, EXTENDED RELEASE ORAL 2 TIMES DAILY
Status: ON HOLD | COMMUNITY
End: 2019-08-26 | Stop reason: HOSPADM

## 2018-11-19 RX ADMIN — DEXTROSE MONOHYDRATE 2450 MG PE: 50 INJECTION, SOLUTION INTRAVENOUS at 05:58

## 2018-11-19 NOTE — ED NOTES
Discharge instructions and follow up care provided. Questions addressed.       Ky Hyatt RN  11/19/18 9113

## 2018-11-26 NOTE — ED PROVIDER NOTES
WITH STENT PLACEMENT  3/19/2014    3/2014-PTCA with 1 stent to LAD prox and 2 stents to LAD mid- Dr Eliza Patel CATH LAB PROCEDURE  3/19/2014    3 stents placed in LAD    INSERTABLE CARDIAC MONITOR Left 11/16/2017    [employer] Reveal LINQ     OTHER SURGICAL HISTORY Right 10/23/15    groin I&D    PTCA  04/07/2014    PTCA and stent of CX     Family History   Problem Relation Age of Onset    Heart Disease Mother     High Blood Pressure Mother     Heart Disease Father     High Blood Pressure Father     High Blood Pressure Brother     High Blood Pressure Brother     High Blood Pressure Brother      Social History     Social History    Marital status: Single     Spouse name: N/A    Number of children: N/A    Years of education: N/A     Occupational History    Not on file. Social History Main Topics    Smoking status: Never Smoker    Smokeless tobacco: Current User     Types: Chew      Comment: rev 8/24/2016. snuff    Alcohol use No      Comment: decaf tea    Drug use: No    Sexual activity: Not on file     Other Topics Concern    Not on file     Social History Narrative    No narrative on file     No current facility-administered medications for this encounter. Current Outpatient Prescriptions   Medication Sig Dispense Refill    gabapentin (NEURONTIN) 600 MG tablet Take 600 mg by mouth 2 times daily. .      LORazepam (ATIVAN) 2 MG/ML injection Inject 1 mg into the muscle every 4 hours. Tamara Cascades insulin lispro (HUMALOG) 100 UNIT/ML injection vial Inject into the skin 2 times daily (before meals)      b complex vitamins capsule Take 1 capsule by mouth daily      folic acid (FOLVITE) 1 MG tablet Take 1 mg by mouth daily      phenytoin (DILANTIN) 100 MG ER capsule Take 300 mg by mouth 2 times daily      clopidogrel (PLAVIX) 75 MG tablet Take 75 mg by mouth daily      oxybutynin (DITROPAN) 5 MG tablet Take 5 mg by mouth 3 times daily      metoprolol succinate (TOPROL XL) 25 symptoms which are most concerning that necessitate immediate return. We also discussed returning to the Emergency Department immediately if new or worsening symptoms occur.        I independently managed patient today in the ED      /86   Pulse 76   Temp 99.4 °F (37.4 °C) (Oral)   Resp 18   Ht 5' 3\" (1.6 m)   Wt (!) 360 lb (163.3 kg)   SpO2 97%   BMI 63.77 kg/m²       Clinical Impression:  1. Breakthrough seizure ([address]        Disposition referral (if applicable):  Gaetano Caballero MD  [address] [place] [address] [phone]    In 2 days      [facility] Emergency Department  [facility] [id number] [phone]    If symptoms worsen or persist    Disposition medications (if applicable):  Discharge Medication List as of 11/19/2018  8:27 AM            Comment: Please note this report has been produced using speech recognition software and may contain errors related to that system including errors in grammar, punctuation, and spelling, as well as words and phrases that may be inappropriate. If there are any questions or concerns please feel free to contact the dictating provider for clarification.       Marina Bolivar PA-C \       [facility]  11/26/18 1123

## 2018-12-03 ENCOUNTER — TELEPHONE (OUTPATIENT)
Dept: CARDIOLOGY CLINIC | Age: 53
End: 2018-12-03

## 2018-12-04 ENCOUNTER — PROCEDURE VISIT (OUTPATIENT)
Dept: CARDIOLOGY CLINIC | Age: 53
End: 2018-12-04
Payer: MEDICAID

## 2018-12-04 VITALS — SYSTOLIC BLOOD PRESSURE: 118 MMHG | HEART RATE: 74 BPM | DIASTOLIC BLOOD PRESSURE: 74 MMHG

## 2018-12-04 DIAGNOSIS — Z45.09 ENCOUNTER FOR LOOP RECORDER CHECK: Primary | ICD-10-CM

## 2018-12-04 PROCEDURE — 93291 INTERROG DEV EVAL SCRMS IP: CPT | Performed by: INTERNAL MEDICINE

## 2018-12-26 PROCEDURE — 93299 PR REM INTERROG ICPMS/SCRMS <30 D TECH REVIEW: CPT | Performed by: INTERNAL MEDICINE

## 2018-12-27 ENCOUNTER — PROCEDURE VISIT (OUTPATIENT)
Dept: CARDIOLOGY CLINIC | Age: 53
End: 2018-12-27
Payer: MEDICAID

## 2018-12-27 DIAGNOSIS — R55 SYNCOPE, UNSPECIFIED SYNCOPE TYPE: Primary | ICD-10-CM

## 2018-12-27 DIAGNOSIS — Z45.09 ENCOUNTER FOR ELECTRONIC ANALYSIS OF REVEAL EVENT RECORDER: ICD-10-CM

## 2018-12-27 PROCEDURE — 93298 REM INTERROG DEV EVAL SCRMS: CPT | Performed by: INTERNAL MEDICINE

## 2019-01-20 ENCOUNTER — HOSPITAL ENCOUNTER (OUTPATIENT)
Dept: SLEEP CENTER | Age: 54
Discharge: HOME | End: 2019-01-20
Payer: MEDICAID

## 2019-01-20 VITALS — WEIGHT: 315 LBS | HEIGHT: 63 IN | BODY MASS INDEX: 55.81 KG/M2

## 2019-01-20 DIAGNOSIS — G47.33 OBSTRUCTIVE SLEEP APNEA: ICD-10-CM

## 2019-01-20 PROCEDURE — 95811 POLYSOM 6/>YRS CPAP 4/> PARM: CPT

## 2019-01-20 ASSESSMENT — SLEEP AND FATIGUE QUESTIONNAIRES
HOW LIKELY ARE YOU TO NOD OFF OR FALL ASLEEP WHILE SITTING AND READING: 3
HOW LIKELY ARE YOU TO NOD OFF OR FALL ASLEEP WHILE LYING DOWN TO REST IN THE AFTERNOON WHEN CIRCUMSTANCES PERMIT: 3
HOW LIKELY ARE YOU TO NOD OFF OR FALL ASLEEP WHILE SITTING AND TALKING TO SOMEONE: 3
HOW LIKELY ARE YOU TO NOD OFF OR FALL ASLEEP IN A CAR, WHILE STOPPED FOR A FEW MINUTES IN TRAFFIC: 0
ESS TOTAL SCORE: 21
HOW LIKELY ARE YOU TO NOD OFF OR FALL ASLEEP WHILE SITTING INACTIVE IN A PUBLIC PLACE: 3
HOW LIKELY ARE YOU TO NOD OFF OR FALL ASLEEP WHEN YOU ARE A PASSENGER IN A CAR FOR AN HOUR WITHOUT A BREAK: 3
HOW LIKELY ARE YOU TO NOD OFF OR FALL ASLEEP WHILE SITTING QUIETLY AFTER LUNCH WITHOUT ALCOHOL: 3
HOW LIKELY ARE YOU TO NOD OFF OR FALL ASLEEP WHILE WATCHING TV: 3

## 2019-01-31 ENCOUNTER — OFFICE VISIT (OUTPATIENT)
Dept: ORTHOPEDIC SURGERY | Age: 54
End: 2019-01-31
Payer: MEDICAID

## 2019-01-31 VITALS
HEART RATE: 69 BPM | WEIGHT: 315 LBS | RESPIRATION RATE: 14 BRPM | BODY MASS INDEX: 55.81 KG/M2 | OXYGEN SATURATION: 95 % | HEIGHT: 63 IN

## 2019-01-31 DIAGNOSIS — R52 PAIN: Primary | ICD-10-CM

## 2019-01-31 DIAGNOSIS — M17.11 ARTHRITIS OF KNEE, RIGHT: ICD-10-CM

## 2019-01-31 PROBLEM — K21.9 GASTROESOPHAGEAL REFLUX DISEASE WITHOUT ESOPHAGITIS: Status: ACTIVE | Noted: 2018-03-07

## 2019-01-31 PROBLEM — Z98.61 HISTORY OF PERCUTANEOUS CORONARY INTERVENTION: Status: ACTIVE | Noted: 2018-04-25

## 2019-01-31 PROBLEM — G40.909 EPILEPTIC SEIZURE (HCC): Status: ACTIVE | Noted: 2018-04-25

## 2019-01-31 PROBLEM — K21.9 GERD (GASTROESOPHAGEAL REFLUX DISEASE): Status: ACTIVE | Noted: 2018-04-25

## 2019-01-31 PROBLEM — K59.1 FUNCTIONAL DIARRHEA: Status: ACTIVE | Noted: 2018-05-07

## 2019-01-31 PROBLEM — M62.81 MUSCLE WEAKNESS: Status: ACTIVE | Noted: 2018-04-25

## 2019-01-31 PROBLEM — I63.9 CEREBRAL INFARCTION (HCC): Status: ACTIVE | Noted: 2018-04-25

## 2019-01-31 PROBLEM — E11.9 DIABETES MELLITUS (HCC): Status: ACTIVE | Noted: 2018-08-05

## 2019-01-31 PROBLEM — E13.9 SECONDARY DIABETES MELLITUS (HCC): Status: ACTIVE | Noted: 2018-03-07

## 2019-01-31 PROCEDURE — G8598 ASA/ANTIPLAT THER USED: HCPCS | Performed by: PHYSICIAN ASSISTANT

## 2019-01-31 PROCEDURE — 4004F PT TOBACCO SCREEN RCVD TLK: CPT | Performed by: PHYSICIAN ASSISTANT

## 2019-01-31 PROCEDURE — 3017F COLORECTAL CA SCREEN DOC REV: CPT | Performed by: PHYSICIAN ASSISTANT

## 2019-01-31 PROCEDURE — G8417 CALC BMI ABV UP PARAM F/U: HCPCS | Performed by: PHYSICIAN ASSISTANT

## 2019-01-31 PROCEDURE — G8484 FLU IMMUNIZE NO ADMIN: HCPCS | Performed by: PHYSICIAN ASSISTANT

## 2019-01-31 PROCEDURE — G8427 DOCREV CUR MEDS BY ELIG CLIN: HCPCS | Performed by: PHYSICIAN ASSISTANT

## 2019-01-31 PROCEDURE — 99213 OFFICE O/P EST LOW 20 MIN: CPT | Performed by: PHYSICIAN ASSISTANT

## 2019-02-08 ENCOUNTER — OFFICE VISIT (OUTPATIENT)
Dept: CARDIOLOGY CLINIC | Age: 54
End: 2019-02-08
Payer: MEDICAID

## 2019-02-08 VITALS
WEIGHT: 315 LBS | BODY MASS INDEX: 50.62 KG/M2 | SYSTOLIC BLOOD PRESSURE: 126 MMHG | DIASTOLIC BLOOD PRESSURE: 64 MMHG | HEART RATE: 78 BPM | HEIGHT: 66 IN

## 2019-02-08 DIAGNOSIS — I25.10 CORONARY ARTERY DISEASE INVOLVING NATIVE CORONARY ARTERY OF NATIVE HEART WITHOUT ANGINA PECTORIS: Primary | ICD-10-CM

## 2019-02-08 PROCEDURE — G8427 DOCREV CUR MEDS BY ELIG CLIN: HCPCS | Performed by: INTERNAL MEDICINE

## 2019-02-08 PROCEDURE — G8598 ASA/ANTIPLAT THER USED: HCPCS | Performed by: INTERNAL MEDICINE

## 2019-02-08 PROCEDURE — G8484 FLU IMMUNIZE NO ADMIN: HCPCS | Performed by: INTERNAL MEDICINE

## 2019-02-08 PROCEDURE — G8417 CALC BMI ABV UP PARAM F/U: HCPCS | Performed by: INTERNAL MEDICINE

## 2019-02-08 PROCEDURE — 99214 OFFICE O/P EST MOD 30 MIN: CPT | Performed by: INTERNAL MEDICINE

## 2019-02-08 PROCEDURE — 3017F COLORECTAL CA SCREEN DOC REV: CPT | Performed by: INTERNAL MEDICINE

## 2019-02-08 PROCEDURE — 4004F PT TOBACCO SCREEN RCVD TLK: CPT | Performed by: INTERNAL MEDICINE

## 2019-02-08 RX ORDER — GABAPENTIN 100 MG/1
100 CAPSULE ORAL 3 TIMES DAILY
Status: ON HOLD | COMMUNITY
End: 2019-08-26 | Stop reason: HOSPADM

## 2019-02-12 ENCOUNTER — PROCEDURE VISIT (OUTPATIENT)
Dept: CARDIOLOGY CLINIC | Age: 54
End: 2019-02-12
Payer: MEDICAID

## 2019-02-12 DIAGNOSIS — R55 SYNCOPE, UNSPECIFIED SYNCOPE TYPE: Primary | ICD-10-CM

## 2019-02-12 DIAGNOSIS — Z45.09 ENCOUNTER FOR ELECTRONIC ANALYSIS OF REVEAL EVENT RECORDER: ICD-10-CM

## 2019-02-12 PROCEDURE — 93298 REM INTERROG DEV EVAL SCRMS: CPT | Performed by: INTERNAL MEDICINE

## 2019-02-12 PROCEDURE — 93299 PR REM INTERROG ICPMS/SCRMS <30 D TECH REVIEW: CPT | Performed by: INTERNAL MEDICINE

## 2019-02-19 ENCOUNTER — TELEPHONE (OUTPATIENT)
Dept: CARDIOLOGY CLINIC | Age: 54
End: 2019-02-19

## 2019-03-25 ENCOUNTER — OFFICE VISIT (OUTPATIENT)
Dept: ORTHOPEDIC SURGERY | Age: 54
End: 2019-03-25
Payer: MEDICAID

## 2019-03-25 ENCOUNTER — TELEPHONE (OUTPATIENT)
Dept: ORTHOPEDIC SURGERY | Age: 54
End: 2019-03-25

## 2019-03-25 VITALS — HEIGHT: 66 IN | HEART RATE: 72 BPM | OXYGEN SATURATION: 97 % | BODY MASS INDEX: 56.17 KG/M2

## 2019-03-25 DIAGNOSIS — M17.12 PRIMARY OSTEOARTHRITIS OF LEFT KNEE: Primary | ICD-10-CM

## 2019-03-25 PROCEDURE — 99203 OFFICE O/P NEW LOW 30 MIN: CPT | Performed by: ORTHOPAEDIC SURGERY

## 2019-03-25 PROCEDURE — G8598 ASA/ANTIPLAT THER USED: HCPCS | Performed by: ORTHOPAEDIC SURGERY

## 2019-03-25 PROCEDURE — 4004F PT TOBACCO SCREEN RCVD TLK: CPT | Performed by: ORTHOPAEDIC SURGERY

## 2019-03-25 PROCEDURE — G8428 CUR MEDS NOT DOCUMENT: HCPCS | Performed by: ORTHOPAEDIC SURGERY

## 2019-03-25 PROCEDURE — 3017F COLORECTAL CA SCREEN DOC REV: CPT | Performed by: ORTHOPAEDIC SURGERY

## 2019-03-25 PROCEDURE — G8417 CALC BMI ABV UP PARAM F/U: HCPCS | Performed by: ORTHOPAEDIC SURGERY

## 2019-03-25 PROCEDURE — G8484 FLU IMMUNIZE NO ADMIN: HCPCS | Performed by: ORTHOPAEDIC SURGERY

## 2019-03-25 ASSESSMENT — ENCOUNTER SYMPTOMS
BACK PAIN: 0
COLOR CHANGE: 0
CHEST TIGHTNESS: 0

## 2019-04-17 ENCOUNTER — APPOINTMENT (OUTPATIENT)
Dept: CT IMAGING | Age: 54
End: 2019-04-17
Payer: MEDICAID

## 2019-04-17 ENCOUNTER — HOSPITAL ENCOUNTER (EMERGENCY)
Age: 54
Discharge: HOME | End: 2019-04-18
Attending: EMERGENCY MEDICINE
Payer: MEDICAID

## 2019-04-17 VITALS
RESPIRATION RATE: 18 BRPM | WEIGHT: 309 LBS | BODY MASS INDEX: 54.75 KG/M2 | HEIGHT: 63 IN | TEMPERATURE: 98 F | SYSTOLIC BLOOD PRESSURE: 125 MMHG | OXYGEN SATURATION: 97 % | DIASTOLIC BLOOD PRESSURE: 85 MMHG | HEART RATE: 65 BPM

## 2019-04-17 DIAGNOSIS — S09.90XA INJURY OF HEAD, INITIAL ENCOUNTER: Primary | ICD-10-CM

## 2019-04-17 DIAGNOSIS — S02.2XXA CLOSED FRACTURE OF NASAL BONE, INITIAL ENCOUNTER: ICD-10-CM

## 2019-04-17 PROCEDURE — 70450 CT HEAD/BRAIN W/O DYE: CPT

## 2019-04-17 PROCEDURE — 99283 EMERGENCY DEPT VISIT LOW MDM: CPT

## 2019-04-17 PROCEDURE — 70486 CT MAXILLOFACIAL W/O DYE: CPT

## 2019-04-17 PROCEDURE — 72125 CT NECK SPINE W/O DYE: CPT

## 2019-04-17 RX ORDER — ONDANSETRON 4 MG/1
4 TABLET, ORALLY DISINTEGRATING ORAL ONCE
Status: DISCONTINUED | OUTPATIENT
Start: 2019-04-17 | End: 2019-04-18 | Stop reason: HOSPADM

## 2019-04-17 RX ORDER — HYDROCODONE BITARTRATE AND ACETAMINOPHEN 5; 325 MG/1; MG/1
1 TABLET ORAL ONCE
Status: DISCONTINUED | OUTPATIENT
Start: 2019-04-17 | End: 2019-04-18 | Stop reason: HOSPADM

## 2019-04-17 ASSESSMENT — PAIN DESCRIPTION - ONSET: ONSET: SUDDEN

## 2019-04-17 ASSESSMENT — PAIN DESCRIPTION - ORIENTATION: ORIENTATION: ANTERIOR

## 2019-04-17 ASSESSMENT — PAIN DESCRIPTION - LOCATION: LOCATION: FACE

## 2019-04-17 ASSESSMENT — PAIN DESCRIPTION - FREQUENCY: FREQUENCY: CONTINUOUS

## 2019-04-17 ASSESSMENT — PAIN SCALES - GENERAL: PAINLEVEL_OUTOF10: 5

## 2019-04-17 NOTE — ED TRIAGE NOTES
Pt brought to ED by EMS from [facility] due to pt falling forward out of wheelchair and hitting his face on ground. Pt nose was bleeding initially but has currently stopped and this is swelling to his nose. Pt denies any LOC and reports pain 5/10. Pt stuporous at baseline per EMS report.

## 2019-04-17 NOTE — ED PROVIDER NOTES
Triage Chief Complaint:   Fall (Pt fell forward out of wheelchair and hit his face causing nose bleed and pain.)      Crow:  Rosetta Willard is a 48 y.o. male that presents to the emergency department after he fell forward out of his wheelchair and hit his face. He did have some bilateral nares bleeding that has stopped on its own. Deny that he lost any consciousness. States he is a dull aching pain in his face and his nose, describes it as a 5 out of 10. No dizziness. States he is a dull headache. No blurred vision or double vision. Did not hit his neck, back, shoulders. Denies any other complaints. Patient does take Plavix. .    Past Medical History:   Diagnosis Date    CAD (coronary artery disease) 3/19/2014    Diabetes mellitus ([facility] [address]     New diagnosis 3/19/14    Family history of early CAD     Father-age 42's    H/O cardiovascular stress test 5/9/2014    EF 62% normal study    H/O echocardiogram 7/28/15    EF 55-60%. Mild mitral and tricuspid insuff.  H/O echocardiogram 05/09/2018    EF40-50% mild phtn, , TR    History of cardiac catheterization 3/19/2014    3/2014-LAD 99% prox,LAD 80% mid,CX dominant with OM1 50% prox, PDA 80%, RCA 80% mid-PTCA with 1 stent to LAD prox and 2 stents to LAD mid. CX stent in 2nd sitting as OP-Dr Pennie Webster    History of echocardiogram 3/19/2014    3/2014-Mild LVH. Normal global and regional LVSF. EF 60%.  Mild MR/TR;    Hx of Doppler ultrasound 5/09/14    Peripheral- Normal    Hyperlipemia     Loss of consciousness ([address] 3/18/2014    Multiple lacunar infarcts     chronic-basal ganglia region bilaterally    NSTEMI (non-ST elevated myocardial infarction) ([address] 3/19/2014    S/P primary angioplasty with coronary stent 3/19/2014    3/2014-PTCA with 3 stents to LAD;    Seizures [facility])      Past Surgical History:   Procedure Laterality Date    CORONARY ANGIOPLASTY WITH STENT PLACEMENT  3/19/2014    3/2014-PTCA with 1 stent to LAD prox and 2 stents to LAD mid-  [place]    DIAGNOSTIC CARDIAC CATH LAB PROCEDURE  3/19/2014    3 stents placed in LAD    INSERTABLE CARDIAC MONITOR Left 11/16/2017    [employer] Reveal LINQ     OTHER SURGICAL HISTORY Right 10/23/15    groin I&D    PTCA  04/07/2014    PTCA and stent of CX     Family History   Problem Relation Age of Onset    Heart Disease Mother     High Blood Pressure Mother     Heart Disease Father     High Blood Pressure Father     High Blood Pressure Brother     High Blood Pressure Brother     High Blood Pressure Brother      Social History     Socioeconomic History    Marital status: Single     Spouse name: Not on file    Number of children: Not on file    Years of education: Not on file    Highest education level: Not on file   Occupational History    Not on file   Social Needs    Financial resource strain: Not on file    Food insecurity:     Worry: Not on file     Inability: Not on file    Transportation needs:     Medical: Not on file     Non-medical: Not on file   Tobacco Use    Smoking status: Never Smoker    Smokeless tobacco: Current User     Types: Chew    Tobacco comment: rev 8/24/2016.   snuff   Substance and Sexual Activity    Alcohol use: No     Alcohol/week: 0.0 oz     Comment: decaf tea    Drug use: No    Sexual activity: Not on file   Lifestyle    Physical activity:     Days per week: Not on file     Minutes per session: Not on file    Stress: Not on file   Relationships    Social connections:     Talks on phone: Not on file     Gets together: Not on file     Attends Gnosticism service: Not on file     Active member of club or organization: Not on file     Attends meetings of clubs or organizations: Not on file     Relationship status: Not on file    Intimate partner violence:     Fear of current or ex partner: Not on file     Emotionally abused: Not on file     Physically abused: Not on file     Forced sexual activity: Not on file   Other Topics Concern    Not on file   Social History Narrative    Not on file     Current Facility-Administered Medications   Medication Dose Route Frequency Provider Last Rate Last Dose    ondansetron (ZOFRAN-ODT) disintegrating tablet 4 mg  4 mg Oral Once Rukhsana Kang MD        HYDROcodone-acetaminophen (NORCO) 5-325 MG per tablet 1 tablet  1 tablet Oral Once Rukhsana Kang MD         Current Outpatient Medications   Medication Sig Dispense Refill    gabapentin (NEURONTIN) 100 MG capsule Take 100 mg by mouth 3 times daily. .      LORazepam (ATIVAN) 2 MG/ML injection Inject 1 mg into the muscle every 4 hours. Souleymane Pink insulin lispro (HUMALOG) 100 UNIT/ML injection vial Inject into the skin 2 times daily (before meals)      b complex vitamins capsule Take 1 capsule by mouth daily      folic acid (FOLVITE) 1 MG tablet Take 1 mg by mouth daily      phenytoin (DILANTIN) 100 MG ER capsule Take 300 mg by mouth 2 times daily      clopidogrel (PLAVIX) 75 MG tablet Take 75 mg by mouth daily      tamsulosin (FLOMAX) 0.4 MG capsule Take 1 capsule by mouth daily 30 capsule 3    aspirin 81 MG chewable tablet Take 4 tablets by mouth daily 30 tablet 3    insulin aspart (NOVOLOG) 100 UNIT/ML injection vial Inject into the skin 3 times daily (before meals)      oxybutynin (DITROPAN) 5 MG tablet Take 5 mg by mouth 3 times daily      metoprolol succinate (TOPROL XL) 25 MG extended release tablet Take 25 mg by mouth daily      acetaminophen (TYLENOL) 325 MG tablet Take 2 tablets by mouth every 4 hours as needed for Pain (Patient taking differently: Take 500 mg by mouth 3 times daily as needed for Pain ) 120 tablet 3    FLUoxetine (PROZAC) 20 MG capsule Take 1 capsule by mouth daily 30 capsule 3    insulin glargine (LANTUS) 100 UNIT/ML injection vial Inject 10 Units into the skin nightly 1 vial 3    folic acid-pyridoxine-cyancobalamin (FOLTX) 1.13-25-2 MG TABS Take 1 tablet by mouth daily 30 tablet     docusate (COLACE, DULCOLAX) 100 MG CAPS Take 100 mg by mouth 2 times daily      ranitidine (ZANTAC) 150 MG capsule Take 150 mg by mouth 2 times daily      cyclobenzaprine (FLEXERIL) 10 MG tablet Take 10 mg by mouth 3 times daily as needed for Muscle spasms      lisinopril (PRINIVIL;ZESTRIL) 10 MG tablet Take 10 mg by mouth daily      metFORMIN (GLUCOPHAGE) 1000 MG tablet TAKE 1 TABLET BY MOUTH 2 TIMES DAILY (WITH MEALS) 60 tablet 3    atorvastatin (LIPITOR) 80 MG tablet Take 1 tablet by mouth nightly 30 tablet 6    nitroGLYCERIN (NITROSTAT) 0.4 MG SL tablet Place 1 tablet under the tongue every 5 minutes as needed for Chest pain. 25 tablet 0     Allergies   Allergen Reactions    Latex      Nursing Notes Reviewed    ROS:  At least 10 systems reviewed and otherwise negative except as in the [address] Physical Exam:  ED Triage Vitals   Enc Vitals Group      BP       Pulse       Resp       Temp       Temp src       SpO2       Weight       Height       Head Circumference       Peak Flow       Pain Score       Pain Loc       Pain Edu? Excl. in [address]? My pulse oximetry interpretation is which is within the normal range    GENERAL APPEARANCE: Awake and alert. Cooperative. No acute distress. HEAD: Normocephalic. Atraumatic. EYES: EOM's grossly intact. Sclera anicteric. ENT: Mucous membranes are moist. Tolerates saliva. No trismus. nasal swelling, bilaterally. No active bleeding. No septal hematoma   NECK: Supple. No meningismus. Trachea midline. No bony tenderness. HEART: RRR. Radial pulses 2+. LUNGS: Respirations unlabored. CTAB  ABDOMEN: Soft. Non-tender. No guarding or rebound. EXTREMITIES: No acute deformities. SKIN: Warm and dry. NEUROLOGICAL: No gross facial drooping. Moves all 4 extremities spontaneously. PSYCHIATRIC: Normal mood. I have reviewed and interpreted all of the currently available lab results from this visit (if applicable):  No results found for this visit on 04/17/19.      Radiographs:  [] Radiologist's Wet Read Report Reviewed:       CT weight based  adjustment of the mA/kV was utilized to reduce the radiation dose to as low  as reasonably achievable. COMPARISON:  CT scan of the head 11/19/2018    HISTORY:  ORDERING SYSTEM PROVIDED HISTORY: HEADACHE, POST TRAUMA  TECHNOLOGIST PROVIDED HISTORY:  Has a \"code stroke\" or \"stroke alert\" been called? ->No  Ordering Physician Provided Reason for Exam:  pt falling forward out of  wheelchair and hitting his face on ground. Pt nose was bleeding initially but  has currently stopped and this is swelling to his nose  Acuity: Acute  Type of Exam: Initial  Mechanism of Injury: fall  Relevant Medical/Surgical History: hc cva, seizures, dm    FINDINGS:  BRAIN/VENTRICLES: There is no acute intracranial hemorrhage, mass effect or  midline shift.  No abnormal extra-axial fluid collection.  The gray-white  differentiation is maintained without evidence of an acute infarct.  There is  no evidence of hydrocephalus. Periventricular and subcortical white matter  hypodensities are again demonstrated.  Bilateral lacunar infarcts are  unchanged. ORBITS: The visualized portion of the orbits demonstrate no acute abnormality. SINUSES: The visualized paranasal sinuses and mastoid air cells demonstrate  no acute abnormality. SOFT TISSUES/SKULL:  No acute abnormality of the visualized skull or soft  tissues.                    CT Facial Bones WO Contrast (Final result)   Result time 04/17/19 21:05:40   Final result by Azra Ramirez MD (04/17/19 21:05:40)                Impression:    Mild nasal soft tissue swelling findings suspicious for nondisplaced left  anterior maxillary process fracture. Narrative:    EXAMINATION:  CT OF THE FACE WITHOUT CONTRAST  4/17/2019 8:35 pm    TECHNIQUE:  CT of the face was performed without the administration of intravenous  contrast. Multiplanar reformatted images are provided for review.  Dose  modulation, iterative reconstruction, and/or weight based adjustment of the  mA/kV was [place] [id number] [phone]            Disposition medications (if applicable):  New Prescriptions    No medications on file         (Please note that portions of this note may have been completed with a voice recognition program. Efforts were made to edit the dictations but occasionally words are mis-transcribed.)    MD Maikol Villar MD  04/17/19 [phone] Greg Quarles MD  04/17/19 2354

## 2019-04-18 NOTE — ED NOTES
Pt was using the urinal at the side of the bed when his foot slipped and slid himself against chair and bed as this nurse entered the room he lowered himself to the floor on his bottom. Pt did not hit bottom or any other part of his body hard against anything in the room and pt denies any pain.       Sami Barreto RN  04/17/19 [id number]

## 2019-04-18 NOTE — ED NOTES
Pt asked if he needed medications for pain but pt reported not needing medications at this time stating the pain was tolerable.       Lety Tenorio RN  04/17/19 [id number]

## 2019-05-18 ENCOUNTER — APPOINTMENT (OUTPATIENT)
Dept: GENERAL RADIOLOGY | Age: 54
DRG: 052 | End: 2019-05-18
Payer: MEDICAID

## 2019-05-18 ENCOUNTER — HOSPITAL ENCOUNTER (INPATIENT)
Age: 54
LOS: 5 days | Discharge: ANOTHER HOSPITAL | DRG: 052 | End: 2019-05-23
Attending: EMERGENCY MEDICINE | Admitting: HOSPITALIST
Payer: MEDICAID

## 2019-05-18 DIAGNOSIS — N39.0 URINARY TRACT INFECTION WITH HEMATURIA, SITE UNSPECIFIED: ICD-10-CM

## 2019-05-18 DIAGNOSIS — R41.82 ALTERED MENTAL STATUS, UNSPECIFIED ALTERED MENTAL STATUS TYPE: Primary | ICD-10-CM

## 2019-05-18 DIAGNOSIS — E66.01 MORBID OBESITY (HCC): ICD-10-CM

## 2019-05-18 DIAGNOSIS — R77.8 TROPONIN LEVEL ELEVATED: ICD-10-CM

## 2019-05-18 DIAGNOSIS — R31.9 URINARY TRACT INFECTION WITH HEMATURIA, SITE UNSPECIFIED: ICD-10-CM

## 2019-05-18 PROBLEM — G93.41 METABOLIC ENCEPHALOPATHY: Status: ACTIVE | Noted: 2019-05-18

## 2019-05-18 LAB
ACETAMINOPHEN LEVEL: <5 UG/ML (ref 15–30)
ALBUMIN SERPL-MCNC: 3.7 GM/DL (ref 3.4–5)
ALCOHOL SCREEN SERUM: NORMAL %WT/VOL
ALP BLD-CCNC: 169 IU/L (ref 40–129)
ALT SERPL-CCNC: 10 U/L (ref 10–40)
AMMONIA: 60 UMOL/L (ref 16–60)
AMPHETAMINES: ABNORMAL
ANION GAP SERPL CALCULATED.3IONS-SCNC: 8 MMOL/L (ref 4–16)
APTT: 32.1 SECONDS (ref 21.2–33)
AST SERPL-CCNC: 9 IU/L (ref 15–37)
BACTERIA: ABNORMAL /HPF
BARBITURATE SCREEN URINE: NEGATIVE
BASE EXCESS MIXED: ABNORMAL (ref 0–1.2)
BASOPHILS ABSOLUTE: 0 K/CU MM
BASOPHILS RELATIVE PERCENT: 0.3 % (ref 0–1)
BENZODIAZEPINE SCREEN, URINE: NEGATIVE
BILIRUB SERPL-MCNC: 0.2 MG/DL (ref 0–1)
BILIRUBIN URINE: NEGATIVE MG/DL
BLOOD, URINE: ABNORMAL
BUN BLDV-MCNC: 18 MG/DL (ref 6–23)
CALCIUM SERPL-MCNC: 8.9 MG/DL (ref 8.3–10.6)
CANNABINOID SCREEN URINE: NEGATIVE
CHLORIDE BLD-SCNC: 104 MMOL/L (ref 99–110)
CHP ED QC CHECK: YES
CLARITY: ABNORMAL
CO2: 30 MMOL/L (ref 21–32)
COCAINE METABOLITE: NEGATIVE
COLOR: YELLOW
COMMENT: ABNORMAL
CREAT SERPL-MCNC: 0.8 MG/DL (ref 0.9–1.3)
DIFFERENTIAL TYPE: ABNORMAL
DOSE AMOUNT: ABNORMAL
DOSE AMOUNT: ABNORMAL
DOSE TIME: ABNORMAL
DOSE TIME: ABNORMAL
EOSINOPHILS ABSOLUTE: 0 K/CU MM
EOSINOPHILS RELATIVE PERCENT: 0.1 % (ref 0–3)
GFR AFRICAN AMERICAN: >60 ML/MIN/1.73M2
GFR NON-AFRICAN AMERICAN: >60 ML/MIN/1.73M2
GLUCOSE BLD-MCNC: 145 MG/DL
GLUCOSE BLD-MCNC: 145 MG/DL (ref 70–99)
GLUCOSE BLD-MCNC: 152 MG/DL (ref 70–99)
GLUCOSE, URINE: NEGATIVE MG/DL
HCO3 VENOUS: 31.3 MMOL/L (ref 19–25)
HCT VFR BLD CALC: 33 % (ref 42–52)
HEMOGLOBIN: 9.6 GM/DL (ref 13.5–18)
HYALINE CASTS: 0 /LPF
IMMATURE NEUTROPHIL %: 0.3 % (ref 0–0.43)
INR BLD: 1.12 INDEX
KETONES, URINE: ABNORMAL MG/DL
LACTATE: 1 MMOL/L (ref 0.4–2)
LEUKOCYTE ESTERASE, URINE: ABNORMAL
LIPASE: 14 IU/L (ref 13–60)
LYMPHOCYTES ABSOLUTE: 0.9 K/CU MM
LYMPHOCYTES RELATIVE PERCENT: 9.1 % (ref 24–44)
MAGNESIUM: 1.7 MG/DL (ref 1.8–2.4)
MCH RBC QN AUTO: 25.6 PG (ref 27–31)
MCHC RBC AUTO-ENTMCNC: 29.1 % (ref 32–36)
MCV RBC AUTO: 88 FL (ref 78–100)
MONOCYTES ABSOLUTE: 0.3 K/CU MM
MONOCYTES RELATIVE PERCENT: 3 % (ref 0–4)
MUCUS: ABNORMAL HPF
NITRITE URINE, QUANTITATIVE: POSITIVE
NUCLEATED RBC %: 0 %
O2 SAT, VEN: 93.8 % (ref 50–70)
OPIATES, URINE: NEGATIVE
OXYCODONE: ABNORMAL
PCO2, VEN: 58 MMHG (ref 38–52)
PDW BLD-RTO: 15.3 % (ref 11.7–14.9)
PH VENOUS: 7.34 (ref 7.32–7.42)
PH, URINE: 5 (ref 5–8)
PHENCYCLIDINE, URINE: NEGATIVE
PLATELET # BLD: 268 K/CU MM (ref 140–440)
PMV BLD AUTO: 9 FL (ref 7.5–11.1)
PO2, VEN: 183 MMHG (ref 28–48)
POTASSIUM SERPL-SCNC: 4 MMOL/L (ref 3.5–5.1)
PRO-BNP: 830.3 PG/ML
PROTEIN UA: 100 MG/DL
PROTHROMBIN TIME: 12.8 SECONDS (ref 9.12–12.5)
RBC # BLD: 3.75 M/CU MM (ref 4.6–6.2)
RBC URINE: 12 /HPF (ref 0–3)
REASON FOR REJECTION: NORMAL
REJECTED TEST: NORMAL
REJECTED TEST: NORMAL
SALICYLATE LEVEL: <0.3 MG/DL (ref 15–30)
SEGMENTED NEUTROPHILS ABSOLUTE COUNT: 8.4 K/CU MM
SEGMENTED NEUTROPHILS RELATIVE PERCENT: 87.2 % (ref 36–66)
SODIUM BLD-SCNC: 142 MMOL/L (ref 135–145)
SPECIFIC GRAVITY UA: 1.02 (ref 1–1.03)
SQUAMOUS EPITHELIAL: 2 /HPF
TOTAL CK: 239 IU/L (ref 38–174)
TOTAL IMMATURE NEUTOROPHIL: 0.03 K/CU MM
TOTAL NUCLEATED RBC: 0 K/CU MM
TOTAL PROTEIN: 7.1 GM/DL (ref 6.4–8.2)
TRICHOMONAS: ABNORMAL /HPF
TROPONIN T: 0.02 NG/ML
TSH HIGH SENSITIVITY: 0.88 UIU/ML (ref 0.27–4.2)
UROBILINOGEN, URINE: NORMAL MG/DL (ref 0.2–1)
WBC # BLD: 9.6 K/CU MM (ref 4–10.5)
WBC CLUMP: ABNORMAL /HPF
WBC UA: 111 /HPF (ref 0–2)

## 2019-05-18 PROCEDURE — 85610 PROTHROMBIN TIME: CPT

## 2019-05-18 PROCEDURE — 81001 URINALYSIS AUTO W/SCOPE: CPT

## 2019-05-18 PROCEDURE — G0480 DRUG TEST DEF 1-7 CLASSES: HCPCS

## 2019-05-18 PROCEDURE — 82140 ASSAY OF AMMONIA: CPT

## 2019-05-18 PROCEDURE — 84484 ASSAY OF TROPONIN QUANT: CPT

## 2019-05-18 PROCEDURE — 80307 DRUG TEST PRSMV CHEM ANLYZR: CPT

## 2019-05-18 PROCEDURE — 99285 EMERGENCY DEPT VISIT HI MDM: CPT

## 2019-05-18 PROCEDURE — 87086 URINE CULTURE/COLONY COUNT: CPT

## 2019-05-18 PROCEDURE — 84443 ASSAY THYROID STIM HORMONE: CPT

## 2019-05-18 PROCEDURE — 83880 ASSAY OF NATRIURETIC PEPTIDE: CPT

## 2019-05-18 PROCEDURE — 82962 GLUCOSE BLOOD TEST: CPT

## 2019-05-18 PROCEDURE — 83605 ASSAY OF LACTIC ACID: CPT

## 2019-05-18 PROCEDURE — 82550 ASSAY OF CK (CPK): CPT

## 2019-05-18 PROCEDURE — 83690 ASSAY OF LIPASE: CPT

## 2019-05-18 PROCEDURE — 4500000027

## 2019-05-18 PROCEDURE — 82805 BLOOD GASES W/O2 SATURATION: CPT

## 2019-05-18 PROCEDURE — 80053 COMPREHEN METABOLIC PANEL: CPT

## 2019-05-18 PROCEDURE — 85730 THROMBOPLASTIN TIME PARTIAL: CPT

## 2019-05-18 PROCEDURE — 87186 SC STD MICRODIL/AGAR DIL: CPT

## 2019-05-18 PROCEDURE — 93005 ELECTROCARDIOGRAM TRACING: CPT | Performed by: EMERGENCY MEDICINE

## 2019-05-18 PROCEDURE — 85025 COMPLETE CBC W/AUTO DIFF WBC: CPT

## 2019-05-18 PROCEDURE — 71045 X-RAY EXAM CHEST 1 VIEW: CPT

## 2019-05-18 PROCEDURE — 1200000000 HC SEMI PRIVATE

## 2019-05-18 PROCEDURE — 87077 CULTURE AEROBIC IDENTIFY: CPT

## 2019-05-18 PROCEDURE — 83735 ASSAY OF MAGNESIUM: CPT

## 2019-05-18 RX ORDER — NITROGLYCERIN 0.4 MG/1
0.4 TABLET SUBLINGUAL EVERY 5 MIN PRN
Status: DISCONTINUED | OUTPATIENT
Start: 2019-05-18 | End: 2019-05-23 | Stop reason: HOSPADM

## 2019-05-18 RX ORDER — FOLIC ACID 1 MG/1
1 TABLET ORAL DAILY
Status: DISCONTINUED | OUTPATIENT
Start: 2019-05-19 | End: 2019-05-23 | Stop reason: HOSPADM

## 2019-05-18 RX ORDER — CYCLOBENZAPRINE HCL 10 MG
10 TABLET ORAL 3 TIMES DAILY PRN
Status: DISCONTINUED | OUTPATIENT
Start: 2019-05-18 | End: 2019-05-23 | Stop reason: HOSPADM

## 2019-05-18 RX ORDER — ASPIRIN 81 MG/1
324 TABLET, CHEWABLE ORAL DAILY
Status: DISCONTINUED | OUTPATIENT
Start: 2019-05-19 | End: 2019-05-23 | Stop reason: HOSPADM

## 2019-05-18 RX ORDER — CLOPIDOGREL BISULFATE 75 MG/1
75 TABLET ORAL DAILY
Status: DISCONTINUED | OUTPATIENT
Start: 2019-05-19 | End: 2019-05-23 | Stop reason: HOSPADM

## 2019-05-18 RX ORDER — ATORVASTATIN CALCIUM 40 MG/1
80 TABLET, FILM COATED ORAL NIGHTLY
Status: DISCONTINUED | OUTPATIENT
Start: 2019-05-19 | End: 2019-05-23 | Stop reason: HOSPADM

## 2019-05-18 RX ORDER — VITAMIN B COMPLEX
1 CAPSULE ORAL DAILY
Status: DISCONTINUED | OUTPATIENT
Start: 2019-05-19 | End: 2019-05-23 | Stop reason: HOSPADM

## 2019-05-18 RX ORDER — B12/LEVOMEFOLATE CALCIUM/B-6 2-1.13-25
1 TABLET ORAL DAILY
Status: DISCONTINUED | OUTPATIENT
Start: 2019-05-19 | End: 2019-05-23 | Stop reason: HOSPADM

## 2019-05-18 RX ORDER — NICOTINE POLACRILEX 4 MG
15 LOZENGE BUCCAL PRN
Status: DISCONTINUED | OUTPATIENT
Start: 2019-05-18 | End: 2019-05-23 | Stop reason: HOSPADM

## 2019-05-18 RX ORDER — TAMSULOSIN HYDROCHLORIDE 0.4 MG/1
0.4 CAPSULE ORAL DAILY
Status: DISCONTINUED | OUTPATIENT
Start: 2019-05-19 | End: 2019-05-23 | Stop reason: HOSPADM

## 2019-05-18 RX ORDER — FAMOTIDINE 20 MG/1
20 TABLET, FILM COATED ORAL 2 TIMES DAILY
Status: DISCONTINUED | OUTPATIENT
Start: 2019-05-19 | End: 2019-05-23 | Stop reason: HOSPADM

## 2019-05-18 RX ORDER — INSULIN GLARGINE 100 [IU]/ML
10 INJECTION, SOLUTION SUBCUTANEOUS NIGHTLY
Status: DISCONTINUED | OUTPATIENT
Start: 2019-05-19 | End: 2019-05-23 | Stop reason: HOSPADM

## 2019-05-18 RX ORDER — METOPROLOL SUCCINATE 25 MG/1
25 TABLET, EXTENDED RELEASE ORAL DAILY
Status: DISCONTINUED | OUTPATIENT
Start: 2019-05-19 | End: 2019-05-23 | Stop reason: HOSPADM

## 2019-05-18 RX ORDER — DOCUSATE SODIUM 100 MG/1
100 CAPSULE, LIQUID FILLED ORAL 2 TIMES DAILY
Status: DISCONTINUED | OUTPATIENT
Start: 2019-05-19 | End: 2019-05-23 | Stop reason: HOSPADM

## 2019-05-18 RX ORDER — FLUOXETINE HYDROCHLORIDE 20 MG/1
20 CAPSULE ORAL DAILY
Status: DISCONTINUED | OUTPATIENT
Start: 2019-05-19 | End: 2019-05-23 | Stop reason: HOSPADM

## 2019-05-18 RX ORDER — SODIUM CHLORIDE 0.9 % (FLUSH) 0.9 %
10 SYRINGE (ML) INJECTION EVERY 12 HOURS SCHEDULED
Status: DISCONTINUED | OUTPATIENT
Start: 2019-05-19 | End: 2019-05-23 | Stop reason: HOSPADM

## 2019-05-18 RX ORDER — LORAZEPAM 2 MG/ML
2 INJECTION INTRAMUSCULAR EVERY 6 HOURS PRN
Status: DISCONTINUED | OUTPATIENT
Start: 2019-05-18 | End: 2019-05-23 | Stop reason: HOSPADM

## 2019-05-18 RX ORDER — GABAPENTIN 100 MG/1
100 CAPSULE ORAL 3 TIMES DAILY
Status: DISCONTINUED | OUTPATIENT
Start: 2019-05-19 | End: 2019-05-23 | Stop reason: HOSPADM

## 2019-05-18 RX ORDER — SODIUM CHLORIDE 0.9 % (FLUSH) 0.9 %
10 SYRINGE (ML) INJECTION PRN
Status: DISCONTINUED | OUTPATIENT
Start: 2019-05-18 | End: 2019-05-23 | Stop reason: HOSPADM

## 2019-05-18 RX ORDER — DEXTROSE MONOHYDRATE 50 MG/ML
100 INJECTION, SOLUTION INTRAVENOUS PRN
Status: DISCONTINUED | OUTPATIENT
Start: 2019-05-18 | End: 2019-05-23 | Stop reason: HOSPADM

## 2019-05-18 RX ORDER — PHENYTOIN SODIUM 100 MG/1
300 CAPSULE, EXTENDED RELEASE ORAL 2 TIMES DAILY
Status: DISCONTINUED | OUTPATIENT
Start: 2019-05-19 | End: 2019-05-19

## 2019-05-18 RX ORDER — LISINOPRIL 10 MG/1
10 TABLET ORAL DAILY
Status: DISCONTINUED | OUTPATIENT
Start: 2019-05-19 | End: 2019-05-23 | Stop reason: HOSPADM

## 2019-05-18 RX ORDER — ONDANSETRON 2 MG/ML
4 INJECTION INTRAMUSCULAR; INTRAVENOUS EVERY 6 HOURS PRN
Status: DISCONTINUED | OUTPATIENT
Start: 2019-05-18 | End: 2019-05-23 | Stop reason: HOSPADM

## 2019-05-18 RX ORDER — OXYBUTYNIN CHLORIDE 5 MG/1
5 TABLET ORAL 3 TIMES DAILY
Status: DISCONTINUED | OUTPATIENT
Start: 2019-05-19 | End: 2019-05-23 | Stop reason: HOSPADM

## 2019-05-18 RX ORDER — DEXTROSE MONOHYDRATE 25 G/50ML
12.5 INJECTION, SOLUTION INTRAVENOUS PRN
Status: DISCONTINUED | OUTPATIENT
Start: 2019-05-18 | End: 2019-05-23 | Stop reason: HOSPADM

## 2019-05-19 ENCOUNTER — APPOINTMENT (OUTPATIENT)
Dept: CT IMAGING | Age: 54
DRG: 052 | End: 2019-05-19
Payer: MEDICAID

## 2019-05-19 LAB
ANION GAP SERPL CALCULATED.3IONS-SCNC: 9 MMOL/L (ref 4–16)
BASOPHILS ABSOLUTE: 0 K/CU MM
BASOPHILS RELATIVE PERCENT: 0.4 % (ref 0–1)
BUN BLDV-MCNC: 19 MG/DL (ref 6–23)
CALCIUM SERPL-MCNC: 8.5 MG/DL (ref 8.3–10.6)
CHLORIDE BLD-SCNC: 107 MMOL/L (ref 99–110)
CO2: 27 MMOL/L (ref 21–32)
CREAT SERPL-MCNC: 0.8 MG/DL (ref 0.9–1.3)
DIFFERENTIAL TYPE: ABNORMAL
EOSINOPHILS ABSOLUTE: 0 K/CU MM
EOSINOPHILS RELATIVE PERCENT: 0.2 % (ref 0–3)
GFR AFRICAN AMERICAN: >60 ML/MIN/1.73M2
GFR NON-AFRICAN AMERICAN: >60 ML/MIN/1.73M2
GLUCOSE BLD-MCNC: 111 MG/DL (ref 70–99)
GLUCOSE BLD-MCNC: 72 MG/DL (ref 70–99)
GLUCOSE BLD-MCNC: 74 MG/DL (ref 70–99)
GLUCOSE BLD-MCNC: 82 MG/DL (ref 70–99)
GLUCOSE BLD-MCNC: 95 MG/DL (ref 70–99)
GLUCOSE BLD-MCNC: 97 MG/DL (ref 70–99)
HCT VFR BLD CALC: 32.9 % (ref 42–52)
HEMOGLOBIN: 9.3 GM/DL (ref 13.5–18)
IMMATURE NEUTROPHIL %: 0.4 % (ref 0–0.43)
LYMPHOCYTES ABSOLUTE: 1.4 K/CU MM
LYMPHOCYTES RELATIVE PERCENT: 16.2 % (ref 24–44)
MCH RBC QN AUTO: 25.3 PG (ref 27–31)
MCHC RBC AUTO-ENTMCNC: 28.3 % (ref 32–36)
MCV RBC AUTO: 89.6 FL (ref 78–100)
MONOCYTES ABSOLUTE: 0.5 K/CU MM
MONOCYTES RELATIVE PERCENT: 6 % (ref 0–4)
NUCLEATED RBC %: 0 %
PDW BLD-RTO: 15.3 % (ref 11.7–14.9)
PLATELET # BLD: 260 K/CU MM (ref 140–440)
PMV BLD AUTO: 8.8 FL (ref 7.5–11.1)
POTASSIUM SERPL-SCNC: 4.2 MMOL/L (ref 3.5–5.1)
RBC # BLD: 3.67 M/CU MM (ref 4.6–6.2)
SEGMENTED NEUTROPHILS ABSOLUTE COUNT: 6.6 K/CU MM
SEGMENTED NEUTROPHILS RELATIVE PERCENT: 76.8 % (ref 36–66)
SODIUM BLD-SCNC: 143 MMOL/L (ref 135–145)
TOTAL IMMATURE NEUTOROPHIL: 0.03 K/CU MM
TOTAL NUCLEATED RBC: 0 K/CU MM
WBC # BLD: 8.5 K/CU MM (ref 4–10.5)

## 2019-05-19 PROCEDURE — 6360000002 HC RX W HCPCS: Performed by: HOSPITALIST

## 2019-05-19 PROCEDURE — 6370000000 HC RX 637 (ALT 250 FOR IP): Performed by: HOSPITALIST

## 2019-05-19 PROCEDURE — 80048 BASIC METABOLIC PNL TOTAL CA: CPT

## 2019-05-19 PROCEDURE — 1200000000 HC SEMI PRIVATE

## 2019-05-19 PROCEDURE — 80186 ASSAY OF PHENYTOIN FREE: CPT

## 2019-05-19 PROCEDURE — 2580000003 HC RX 258: Performed by: HOSPITALIST

## 2019-05-19 PROCEDURE — 70450 CT HEAD/BRAIN W/O DYE: CPT

## 2019-05-19 PROCEDURE — 93010 ELECTROCARDIOGRAM REPORT: CPT | Performed by: INTERNAL MEDICINE

## 2019-05-19 PROCEDURE — 74176 CT ABD & PELVIS W/O CONTRAST: CPT

## 2019-05-19 PROCEDURE — 85025 COMPLETE CBC W/AUTO DIFF WBC: CPT

## 2019-05-19 PROCEDURE — 82962 GLUCOSE BLOOD TEST: CPT

## 2019-05-19 PROCEDURE — 70486 CT MAXILLOFACIAL W/O DYE: CPT

## 2019-05-19 RX ADMIN — ATORVASTATIN CALCIUM 80 MG: 40 TABLET, FILM COATED ORAL at 21:59

## 2019-05-19 RX ADMIN — FAMOTIDINE 20 MG: 20 TABLET ORAL at 21:59

## 2019-05-19 RX ADMIN — SODIUM CHLORIDE, PRESERVATIVE FREE 10 ML: 5 INJECTION INTRAVENOUS at 22:01

## 2019-05-19 RX ADMIN — FOSPHENYTOIN SODIUM 150 MG PE: 50 INJECTION INTRAMUSCULAR; INTRAVENOUS at 15:04

## 2019-05-19 RX ADMIN — CEFTRIAXONE 1 G: 1 INJECTION, POWDER, FOR SOLUTION INTRAMUSCULAR; INTRAVENOUS at 01:12

## 2019-05-19 RX ADMIN — FOSPHENYTOIN SODIUM 150 MG PE: 50 INJECTION INTRAMUSCULAR; INTRAVENOUS at 02:06

## 2019-05-19 RX ADMIN — OXYBUTYNIN CHLORIDE 5 MG: 5 TABLET ORAL at 21:59

## 2019-05-19 RX ADMIN — GABAPENTIN 100 MG: 100 CAPSULE ORAL at 21:59

## 2019-05-19 RX ADMIN — FOSPHENYTOIN SODIUM 150 MG PE: 50 INJECTION INTRAMUSCULAR; INTRAVENOUS at 10:04

## 2019-05-19 RX ADMIN — FOSPHENYTOIN SODIUM 150 MG PE: 50 INJECTION INTRAMUSCULAR; INTRAVENOUS at 20:04

## 2019-05-19 ASSESSMENT — PAIN SCALES - GENERAL: PAINLEVEL_OUTOF10: 0

## 2019-05-19 NOTE — ED TRIAGE NOTES
Patient arrival via EMS. Patient from [facility], EMS called for decreased responsiveness. Patient audibly snoring at this time.

## 2019-05-19 NOTE — PROGRESS NOTES
Skin assessment: Assessment done with Swetha Zapata RN present. No skin issues noted. . Pt came to floor from ER in soft restraints. Restraints continued. Dr. Leonor Hanna in to see pt face to face. Pt remains lethargic, will arouse to loud voice and or touch, becomes aggressive during any treatment. Remains confused does not follow most commands.

## 2019-05-19 NOTE — PROGRESS NOTES
Department of Internal Medicine  General Internal Medicine  Attending Progress Note      SUBJECTIVE:  Patient is still altered. He had a BM on himself. He says just yes and no. He lives in a nursing home, but I hear that people still receive drugs via the window in the nursing home.  + Amphetamines.       OBJECTIVE      Medications    Current Facility-Administered Medications: fosphenytoin (CEREBYX) 150 mg PE in dextrose 5 % 50 mL IVPB (maintenance dose), 150 mg PE, Intravenous, Q6H  aspirin chewable tablet 324 mg, 324 mg, Oral, Daily  atorvastatin (LIPITOR) tablet 80 mg, 80 mg, Oral, Nightly  b complex vitamins capsule 1 capsule, 1 capsule, Oral, Daily  clopidogrel (PLAVIX) tablet 75 mg, 75 mg, Oral, Daily  cyclobenzaprine (FLEXERIL) tablet 10 mg, 10 mg, Oral, TID PRN  docusate sodium (COLACE) capsule 100 mg, 100 mg, Oral, BID  FLUoxetine (PROZAC) capsule 20 mg, 20 mg, Oral, Daily  folic acid (FOLVITE) tablet 1 mg, 1 mg, Oral, Daily  folic acid-pyridoxine-cyancobalamin (FOLTX) 1.13-25-2 MG TABS 1 tablet, 1 tablet, Oral, Daily  gabapentin (NEURONTIN) capsule 100 mg, 100 mg, Oral, TID  insulin glargine (LANTUS) injection vial 10 Units, 10 Units, Subcutaneous, Nightly  lisinopril (PRINIVIL;ZESTRIL) tablet 10 mg, 10 mg, Oral, Daily  metoprolol succinate (TOPROL XL) extended release tablet 25 mg, 25 mg, Oral, Daily  nitroGLYCERIN (NITROSTAT) SL tablet 0.4 mg, 0.4 mg, Sublingual, Q5 Min PRN  oxybutynin (DITROPAN) tablet 5 mg, 5 mg, Oral, TID  famotidine (PEPCID) tablet 20 mg, 20 mg, Oral, BID  tamsulosin (FLOMAX) capsule 0.4 mg, 0.4 mg, Oral, Daily  sodium chloride flush 0.9 % injection 10 mL, 10 mL, Intravenous, 2 times per day  sodium chloride flush 0.9 % injection 10 mL, 10 mL, Intravenous, PRN  magnesium hydroxide (MILK OF MAGNESIA) 400 MG/5ML suspension 30 mL, 30 mL, Oral, Daily PRN  ondansetron (ZOFRAN) injection 4 mg, 4 mg, Intravenous, Q6H PRN  enoxaparin (LOVENOX) injection 40 mg, 40 mg, Subcutaneous, Daily  cefTRIAXone (ROCEPHIN) 1 g in dextrose 5 % 50 mL IVPB, 1 g, Intravenous, Q24H  insulin lispro (HUMALOG) injection vial 0-6 Units, 0-6 Units, Subcutaneous, TID WC  insulin lispro (HUMALOG) injection vial 0-3 Units, 0-3 Units, Subcutaneous, Nightly  glucose (GLUTOSE) 40 % oral gel 15 g, 15 g, Oral, PRN  dextrose 50 % solution 12.5 g, 12.5 g, Intravenous, PRN  glucagon (rDNA) injection 1 mg, 1 mg, Intramuscular, PRN  dextrose 5 % solution, 100 mL/hr, Intravenous, PRN  LORazepam (ATIVAN) injection 2 mg, 2 mg, Intravenous, Q6H PRN  Physical    VITALS:  /83   Pulse 54   Temp 97.5 °F (36.4 °C) (Oral)   Resp 20   Ht 5' 3\" (1.6 m)   Wt (!) 390 lb (176.9 kg)   SpO2 99%   BMI 69.09 kg/m²   gEn - A& O x 1  HEENT - P ERRLA, EOMI  CV - RRR no M/G/R, normal s1, s2  Lungs - CTA bilaterally no W/C/R  Abd - soft, NT, ND  Ext - no cyanosis or edema  Skin - no rashes noted    Data    CBC:   Lab Results   Component Value Date    WBC 8.5 05/19/2019    RBC 3.67 05/19/2019    HGB 9.3 05/19/2019    HCT 32.9 05/19/2019    MCV 89.6 05/19/2019    MCH 25.3 05/19/2019    MCHC 28.3 05/19/2019    RDW 15.3 05/19/2019     05/19/2019    MPV 8.8 05/19/2019       ASSESSMENT AND PLAN      Eddi Mcguire is a 48 y.o.  male  who presents with altered mental status     Metabolic encephalopathy secondary to urinary tract infection vs recreational drug use  Utox +ve for amphetamines  Wbc count 9.6 with left shift  CXR with cardiomegaly and vascular congestion, no PNA  Unable to get CT brain due to body weight  UA with wbc 111 and +ve nitrites  Admit to telemetry  Start Ceftriaxone  Follow urine Culture  Monitor mental status     Morbid obesity with functional quadriplegia - BMI is 71  [facility] resident     Seizures   Cont Dilantin and PRN ativan     Hx of stroke 2017  Cont ASA/Plavix/statin     DM with chronic complications and chronic insulin use  Glucose 159  Cont lantus  Hold metformin  Add ISS  Monitor CBG      Radhames Steward MD, MSc

## 2019-05-19 NOTE — ED PROVIDER NOTES
[facility]      TRIAGE CHIEF COMPLAINT:   Altered Mental Status      [place]:  Garry Barrientos is a 48 y.o. male that presents with complaint of altered mental status. Patient from nursing home is morbidly obese here with confusion. On arrival patient is confused he is sleeping he is alert will follow commands but is confused blood sugars normal.  Is morbidly obese altered mental status workup performed no family present. Luis Briceño REVIEW OF SYSTEMS:      Review of Systems   Unable to perform ROS: Mental status change   Psychiatric/Behavioral: Positive for confusion. Past Medical History:   Diagnosis Date    CAD (coronary artery disease) 3/19/2014    Diabetes mellitus ([facility] [address]     New diagnosis 3/19/14    Family history of early CAD     Father-age 42's    H/O cardiovascular stress test 5/9/2014    EF 62% normal study    H/O echocardiogram 7/28/15    EF 55-60%. Mild mitral and tricuspid insuff.  H/O echocardiogram 05/09/2018    EF40-50% mild phtn, , TR    History of cardiac catheterization 3/19/2014    3/2014-LAD 99% prox,LAD 80% mid,CX dominant with OM1 50% prox, PDA 80%, RCA 80% mid-PTCA with 1 stent to LAD prox and 2 stents to LAD mid. CX stent in 2nd sitting as OP-Dr Deric Galvez    History of echocardiogram 3/19/2014    3/2014-Mild LVH. Normal global and regional LVSF. EF 60%.  Mild MR/TR;    Hx of Doppler ultrasound 5/09/14    Peripheral- Normal    Hyperlipemia     Loss of consciousness ([facility] [address] 3/18/2014    Multiple lacunar infarcts     chronic-basal ganglia region bilaterally    NSTEMI (non-ST elevated myocardial infarction) ([facility] [address] 3/19/2014    S/P primary angioplasty with coronary stent 3/19/2014    3/2014-PTCA with 3 stents to LAD;    Seizures [facility])      Past Surgical History:   Procedure Laterality Date    CORONARY ANGIOPLASTY WITH STENT PLACEMENT  3/19/2014    3/2014-PTCA with 1 stent to LAD prox and 2 stents to LAD mid- Dr Becka Spivey 3/19/2014    3 stents placed in LAD    INSERTABLE CARDIAC MONITOR Left 11/16/2017    [employer] Reveal LINQ     OTHER SURGICAL HISTORY Right 10/23/15    groin I&D    PTCA  04/07/2014    PTCA and stent of CX     Family History   Problem Relation Age of Onset    Heart Disease Mother     High Blood Pressure Mother     Heart Disease Father     High Blood Pressure Father     High Blood Pressure Brother     High Blood Pressure Brother     High Blood Pressure Brother      Social History     Socioeconomic History    Marital status: Single     Spouse name: Not on file    Number of children: Not on file    Years of education: Not on file    Highest education level: Not on file   Occupational History    Not on file   Social Needs    Financial resource strain: Not on file    Food insecurity:     Worry: Not on file     Inability: Not on file    Transportation needs:     Medical: Not on file     Non-medical: Not on file   Tobacco Use    Smoking status: Never Smoker    Smokeless tobacco: Current User     Types: Chew    Tobacco comment: rev 8/24/2016.   snuff   Substance and Sexual Activity    Alcohol use: No     Alcohol/week: 0.0 oz     Comment: decaf tea    Drug use: No    Sexual activity: Not on file   Lifestyle    Physical activity:     Days per week: Not on file     Minutes per session: Not on file    Stress: Not on file   Relationships    Social connections:     Talks on phone: Not on file     Gets together: Not on file     Attends Gnosticism service: Not on file     Active member of club or organization: Not on file     Attends meetings of clubs or organizations: Not on file     Relationship status: Not on file    Intimate partner violence:     Fear of current or ex partner: Not on file     Emotionally abused: Not on file     Physically abused: Not on file     Forced sexual activity: Not on file   Other Topics Concern    Not on file   Social History Narrative    Not on file     No current facility-administered medications for this encounter. Current Outpatient Medications   Medication Sig Dispense Refill    gabapentin (NEURONTIN) 100 MG capsule Take 100 mg by mouth 3 times daily. .      LORazepam (ATIVAN) 2 MG/ML injection Inject 1 mg into the muscle every 4 hours. Marvafrances Dudley insulin lispro (HUMALOG) 100 UNIT/ML injection vial Inject into the skin 2 times daily (before meals)      b complex vitamins capsule Take 1 capsule by mouth daily      folic acid (FOLVITE) 1 MG tablet Take 1 mg by mouth daily      phenytoin (DILANTIN) 100 MG ER capsule Take 300 mg by mouth 2 times daily      clopidogrel (PLAVIX) 75 MG tablet Take 75 mg by mouth daily      tamsulosin (FLOMAX) 0.4 MG capsule Take 1 capsule by mouth daily 30 capsule 3    aspirin 81 MG chewable tablet Take 4 tablets by mouth daily 30 tablet 3    insulin aspart (NOVOLOG) 100 UNIT/ML injection vial Inject into the skin 3 times daily (before meals)      oxybutynin (DITROPAN) 5 MG tablet Take 5 mg by mouth 3 times daily      metoprolol succinate (TOPROL XL) 25 MG extended release tablet Take 25 mg by mouth daily      acetaminophen (TYLENOL) 325 MG tablet Take 2 tablets by mouth every 4 hours as needed for Pain (Patient taking differently: Take 500 mg by mouth 3 times daily as needed for Pain ) 120 tablet 3    FLUoxetine (PROZAC) 20 MG capsule Take 1 capsule by mouth daily 30 capsule 3    insulin glargine (LANTUS) 100 UNIT/ML injection vial Inject 10 Units into the skin nightly 1 vial 3    folic acid-pyridoxine-cyancobalamin (FOLTX) 1.13-25-2 MG TABS Take 1 tablet by mouth daily 30 tablet     docusate (COLACE, DULCOLAX) 100 MG CAPS Take 100 mg by mouth 2 times daily      ranitidine (ZANTAC) 150 MG capsule Take 150 mg by mouth 2 times daily      cyclobenzaprine (FLEXERIL) 10 MG tablet Take 10 mg by mouth 3 times daily as needed for Muscle spasms      lisinopril (PRINIVIL;ZESTRIL) 10 MG tablet Take 10 mg by mouth daily      metFORMIN (GLUCOPHAGE) 1000 MG tablet TAKE 1 TABLET BY MOUTH 2 TIMES DAILY (WITH MEALS) 60 tablet 3    atorvastatin (LIPITOR) 80 MG tablet Take 1 tablet by mouth nightly 30 tablet 6    nitroGLYCERIN (NITROSTAT) 0.4 MG SL tablet Place 1 tablet under the tongue every 5 minutes as needed for Chest pain. 25 tablet 0      Allergies   Allergen Reactions    Latex      No current facility-administered medications for this encounter. Current Outpatient Medications   Medication Sig Dispense Refill    gabapentin (NEURONTIN) 100 MG capsule Take 100 mg by mouth 3 times daily. .      LORazepam (ATIVAN) 2 MG/ML injection Inject 1 mg into the muscle every 4 hours. Jweel Diaz insulin lispro (HUMALOG) 100 UNIT/ML injection vial Inject into the skin 2 times daily (before meals)      b complex vitamins capsule Take 1 capsule by mouth daily      folic acid (FOLVITE) 1 MG tablet Take 1 mg by mouth daily      phenytoin (DILANTIN) 100 MG ER capsule Take 300 mg by mouth 2 times daily      clopidogrel (PLAVIX) 75 MG tablet Take 75 mg by mouth daily      tamsulosin (FLOMAX) 0.4 MG capsule Take 1 capsule by mouth daily 30 capsule 3    aspirin 81 MG chewable tablet Take 4 tablets by mouth daily 30 tablet 3    insulin aspart (NOVOLOG) 100 UNIT/ML injection vial Inject into the skin 3 times daily (before meals)      oxybutynin (DITROPAN) 5 MG tablet Take 5 mg by mouth 3 times daily      metoprolol succinate (TOPROL XL) 25 MG extended release tablet Take 25 mg by mouth daily      acetaminophen (TYLENOL) 325 MG tablet Take 2 tablets by mouth every 4 hours as needed for Pain (Patient taking differently: Take 500 mg by mouth 3 times daily as needed for Pain ) 120 tablet 3    FLUoxetine (PROZAC) 20 MG capsule Take 1 capsule by mouth daily 30 capsule 3    insulin glargine (LANTUS) 100 UNIT/ML injection vial Inject 10 Units into the skin nightly 1 vial 3    folic acid-pyridoxine-cyancobalamin (FOLTX) 1.13-25-2 MG TABS Take 1 tablet by mouth daily 30 tablet     docusate (COLACE, DULCOLAX) 100 MG CAPS Take 100 mg by mouth 2 times daily      ranitidine (ZANTAC) 150 MG capsule Take 150 mg by mouth 2 times daily      cyclobenzaprine (FLEXERIL) 10 MG tablet Take 10 mg by mouth 3 times daily as needed for Muscle spasms      lisinopril (PRINIVIL;ZESTRIL) 10 MG tablet Take 10 mg by mouth daily      metFORMIN (GLUCOPHAGE) 1000 MG tablet TAKE 1 TABLET BY MOUTH 2 TIMES DAILY (WITH MEALS) 60 tablet 3    atorvastatin (LIPITOR) 80 MG tablet Take 1 tablet by mouth nightly 30 tablet 6    nitroGLYCERIN (NITROSTAT) 0.4 MG SL tablet Place 1 tablet under the tongue every 5 minutes as needed for Chest pain. 25 tablet 0       Nursing Notes Reviewed    VITAL SIGNS:  ED Triage Vitals [05/18/19 2101]   Enc Vitals Group      BP (!) 129/93      Pulse 62      Resp 12      Temp 98.2 °F (36.8 °C)      Temp Source Oral      SpO2 95 %      Weight (!) 310 lb (140.6 kg)      Height       Head Circumference       Peak Flow       Pain Score       Pain Loc       Pain Edu? Excl. in [address]? PHYSICAL EXAM:  Physical Exam   Constitutional: He appears well-developed and well-nourished. He is sleeping. Non-toxic appearance. He does not have a sickly appearance. He appears ill. No distress. HENT:   Head: Normocephalic and atraumatic. Right Ear: External ear normal.   Left Ear: External ear normal.   Mouth/Throat: Oropharynx is clear and moist.   Eyes: Pupils are equal, round, and reactive to light. Conjunctivae and EOM are normal. Right eye exhibits no discharge. Left eye exhibits no discharge. No scleral icterus. Neck: Normal range of motion, full passive range of motion without pain and phonation normal. No JVD present. No neck rigidity. No edema, no erythema and normal range of motion present. Cardiovascular: Normal rate, regular rhythm, normal heart sounds and intact distal pulses. Exam reveals no gallop and no friction rub. No murmur heard.   Pulmonary/Chest: Effort %   Blood Gas, Venous   Result Value Ref Range    pH, Kailash 7.34 7.32 - 7.42    pCO2, Kailash 58 (H) 38 - 52 mmHG    pO2, Kailash 183 (H) 28 - 48 mmHG    Base Exc, Mixed 3.9  PLUS   (H) 0 - 1.2    HCO3, Venous 31.3 (H) 19 - 25 MMOL/L    O2 Sat, Kailash 93.8 (H) 50 - 70 %    Comment VBG    Ammonia Level   Result Value Ref Range    Ammonia 60 16 - 60 UMOL/L   PT - INR   Result Value Ref Range    Protime 12.8 (H) 9.12 - 12.5 SECONDS    INR 1.12 INDEX   PTT   Result Value Ref Range    aPTT 32.1 21.2 - 33.0 SECONDS   Urinalysis   Result Value Ref Range    Color, UA YELLOW YELLOW    Clarity, UA HAZY (A) CLEAR    Glucose, Urine NEGATIVE NEGATIVE MG/DL    Bilirubin Urine NEGATIVE NEGATIVE MG/DL    Ketones, Urine SMALL (A) NEGATIVE MG/DL    Specific Gravity, UA 1.020 1.001 - 1.035    Blood, Urine MODERATE (A) NEGATIVE    pH, Urine 5.0 5.0 - 8.0    Protein,  (A) NEGATIVE MG/DL    Urobilinogen, Urine NORMAL 0.2 - 1.0 MG/DL    Nitrite Urine, Quantitative POSITIVE (A) NEGATIVE    Leukocyte Esterase, Urine TRACE (A) NEGATIVE    RBC, UA 12 (H) 0 - 3 /HPF    WBC,  (H) 0 - 2 /HPF    Bacteria, UA MODERATE (A) NEGATIVE /HPF    WBC Clumps, UA RARE /HPF    Squam Epithel, UA 2 /HPF    Mucus, UA RARE (A) NEGATIVE HPF    Trichomonas, UA NONE SEEN NONE SEEN /HPF    Hyaline Casts, UA 0 /LPF   Lactic Acid, Plasma   Result Value Ref Range    Lactate 1.0 0.4 - 2.0 mMOL/L   Urine Drug Screen   Result Value Ref Range    Cannabinoid Scrn, Ur NEGATIVE NEGATIVE    Amphetamines UNCONFIRMED POSITIVE (A) NEGATIVE    Cocaine Metabolite NEGATIVE NEGATIVE    Benzodiazepine Screen, Urine NEGATIVE NEGATIVE    Barbiturate Screen, Ur NEGATIVE NEGATIVE    Opiates, Urine NEGATIVE NEGATIVE    Phencyclidine, Urine NEGATIVE NEGATIVE    Oxycodone  NEGATIVE     NEGATIVE          THRESHOLD CONCENTRATIONS (mg/dL)  AMPHT               1000  CAMERON,OPIA             300  BZO,BAR              200  PCP                   25  THC                   50  OXY                  100 IF POSITIVE, SPECIMEN WILL BE  DISCARDED AFTER 6 MONTHS. CALL LAB IF CONFIRMATION NEEDED. ALL NEGATIVE SPECIMENS WILL BE  DISCARDED AFTER ONE WEEK. * UNCONFIRMED POSITIVES MAY  NOT MEET FORENSIC REQUIREMENTS. SPECIMEN REJECTION   Result Value Ref Range    Rejected Test [facility] [address]     Reason for Rejection UNABLE TO PERFORM TESTING:     Reason for Rejection SPECIMEN HEMOLYZED    SPECIMEN REJECTION   Result Value Ref Range    Rejected Test CMPR CPK MG PBNP TSHS TROT     Reason for Rejection UNABLE TO PERFORM TESTING:     Reason for Rejection SPECIMEN HEMOLYZED    Acetaminophen Level   Result Value Ref Range    Acetaminophen Level <5.0 (L) 15 - 30 ug/ml    DOSE AMOUNT DOSE AMT. GIVEN - UNKNOWN     DOSE TIME DOSE TIME GIVEN - UNKNOWN    Ethanol   Result Value Ref Range    Alcohol Scrn <0.01  THE VALUE IS BELOW OUR DETECTION LIMIT. <0.01 %WT/VOL   Comprehensive Metabolic Panel   Result Value Ref Range    Sodium 142 135 - 145 MMOL/L    Potassium 4.0 3.5 - 5.1 MMOL/L    Chloride 104 99 - 110 mMol/L    CO2 30 21 - 32 MMOL/L    BUN 18 6 - 23 MG/DL    CREATININE 0.8 (L) 0.9 - 1.3 MG/DL    Glucose 152 (H) 70 - 99 MG/DL    Calcium 8.9 8.3 - 10.6 MG/DL    Alb 3.7 3.4 - 5.0 GM/DL    Total Protein 7.1 6.4 - 8.2 GM/DL    Total Bilirubin 0.2 0.0 - 1.0 MG/DL    ALT 10 10 - 40 U/L    AST 9 (L) 15 - 37 IU/L    Alkaline Phosphatase 169 (H) 40 - 129 IU/L    GFR Non-African American >60 >60 mL/min/1.73m2    GFR African American >60 >60 mL/min/1.73m2    Anion Gap 8 4 - 16   CK   Result Value Ref Range    Total  (H) 38 - 174 IU/L   Lipase   Result Value Ref Range    Lipase 14 13 - 60 IU/L   Magnesium   Result Value Ref Range    Magnesium 1.7 (L) 1.8 - 2.4 mg/dl   Brain Natriuretic Peptide   Result Value Ref Range    Pro-.3 (H) <113 PG/ML   Salicylate   Result Value Ref Range    Salicylate Lvl <9.7 (L) 15 - 30 MG/DL    DOSE AMOUNT DOSE AMT.  GIVEN - UNKNOWN     DOSE TIME DOSE TIME GIVEN - UNKNOWN Troponin   Result Value Ref Range    Troponin T 0.019 (H) <0.01 NG/ML   TSH without Reflex   Result Value Ref Range    TSH, High Sensitivity 0.875 0.270 - 4.20 uIu/ml   POC Blood Glucose   Result Value Ref Range    Glucose 145 mg/dL    QC OK? yes    POCT Glucose   Result Value Ref Range    POC Glucose 145 (H) 70 - 99 MG/DL   EKG 12 Lead   Result Value Ref Range    Ventricular Rate 60 BPM    Atrial Rate 60 BPM    P-R Interval 220 ms    QRS Duration 108 ms    Q-T Interval 458 ms    QTc Calculation (Bazett) 458 ms    P Axis 17 degrees    R Axis -45 degrees    T Axis 126 degrees    Diagnosis       Sinus rhythm with 1st degree AV block  Low voltage QRS  Left anterior fascicular block  Abnormal QRS-T angle, consider primary T wave abnormality  Abnormal ECG  When compared with ECG of 01-SEP-2018 14:37,  Left anterior fascicular block is now present          Radiographs (if obtained):  [] The following radiograph was interpreted by myself in the absence of a radiologist:  [x] Radiologist's Report Reviewed:    CT abd/pelv, CT brain, CXR    EKG (if obtained): (All EKG's are interpreted by myself in the absence of a cardiologist)      12 lead EKG per my interpretation:  Normal Sinus Rhythm 60 first degree AV block  Axis is   Left axis deviation  QTc is  458  There is no specific T wave changes appreciated. There is no specific ST wave changes appreciated. Prior EKG to compare with was not available     Total critical care time today provided was at least 30 minutes. This excludes seperately billable procedure. Critical care time provided for reviewing labs, images, giving fluids, antibiotics, oxygen, consulting medicine that required close evaluation and/or intervention with concern for patient decompensation. MDM:    Patient here with confusion from nursing home. Is morbidly obese. On arrival he is protecting her airway he is confused he is sleeping but is arousable he will follow commands.   Poor historian I have no family present. Workup performed she appears to have a urinary tract infection I'm awaiting CT scans of his head and abdomen otherwise labs unremarkable I gave him Rocephin, cultures, oxygen, fluids patient admitted for observation and require restraints because he is pulling at devices. CLINICAL IMPRESSION:  Final diagnoses:    Altered mental status, unspecified altered mental status type   Urinary tract infection with hematuria, site unspecified   Morbid obesity ([address]   Troponin level elevated       (Please note that portions of this note may have been completed with a voice recognition program. Efforts were made to edit the dictations but occasionally words aremis-transcribed.)    DISPOSITION REFERRAL (if applicable):  Liliana Calvin MD  [facility]  [phone]            DISPOSITION MEDICATIONS (if applicable):  New Prescriptions    No medications on file          Randy Simmons, [facility],   05/18/19 5066

## 2019-05-19 NOTE — ED NOTES
Patient noted to be attempting to rip out stokes catheter. Unable to be verbally redirected. Hard restraints bilateral upper arms applied.       Reno Flowers, RN  05/18/19 2743

## 2019-05-19 NOTE — ED NOTES
Bed: 01TR-01  Expected date:   Expected time:   Means of arrival:   Comments:  Medic Stanislaw Cisneros RN  05/18/19 2057

## 2019-05-20 LAB
ANION GAP SERPL CALCULATED.3IONS-SCNC: 9 MMOL/L (ref 4–16)
BUN BLDV-MCNC: 16 MG/DL (ref 6–23)
CALCIUM SERPL-MCNC: 8.8 MG/DL (ref 8.3–10.6)
CHLORIDE BLD-SCNC: 104 MMOL/L (ref 99–110)
CO2: 29 MMOL/L (ref 21–32)
CREAT SERPL-MCNC: 0.7 MG/DL (ref 0.9–1.3)
DOSE AMOUNT: ABNORMAL
DOSE TIME: ABNORMAL
GFR AFRICAN AMERICAN: >60 ML/MIN/1.73M2
GFR NON-AFRICAN AMERICAN: >60 ML/MIN/1.73M2
GLUCOSE BLD-MCNC: 130 MG/DL (ref 70–99)
GLUCOSE BLD-MCNC: 82 MG/DL (ref 70–99)
GLUCOSE BLD-MCNC: 87 MG/DL (ref 70–99)
GLUCOSE BLD-MCNC: 91 MG/DL (ref 70–99)
GLUCOSE BLD-MCNC: 94 MG/DL (ref 70–99)
HCT VFR BLD CALC: 34.9 % (ref 42–52)
HEMOGLOBIN: 10 GM/DL (ref 13.5–18)
MCH RBC QN AUTO: 25.5 PG (ref 27–31)
MCHC RBC AUTO-ENTMCNC: 28.7 % (ref 32–36)
MCV RBC AUTO: 89 FL (ref 78–100)
PDW BLD-RTO: 15.4 % (ref 11.7–14.9)
PHENYTOIN LEVEL: 4 UG/ML (ref 10–20)
PLATELET # BLD: 255 K/CU MM (ref 140–440)
PMV BLD AUTO: 8.9 FL (ref 7.5–11.1)
POTASSIUM SERPL-SCNC: 3.7 MMOL/L (ref 3.5–5.1)
RBC # BLD: 3.92 M/CU MM (ref 4.6–6.2)
SODIUM BLD-SCNC: 142 MMOL/L (ref 135–145)
WBC # BLD: 7.2 K/CU MM (ref 4–10.5)

## 2019-05-20 PROCEDURE — 1200000000 HC SEMI PRIVATE

## 2019-05-20 PROCEDURE — 6370000000 HC RX 637 (ALT 250 FOR IP): Performed by: INTERNAL MEDICINE

## 2019-05-20 PROCEDURE — 2580000003 HC RX 258: Performed by: HOSPITALIST

## 2019-05-20 PROCEDURE — 36415 COLL VENOUS BLD VENIPUNCTURE: CPT

## 2019-05-20 PROCEDURE — 6360000002 HC RX W HCPCS: Performed by: HOSPITALIST

## 2019-05-20 PROCEDURE — 82962 GLUCOSE BLOOD TEST: CPT

## 2019-05-20 PROCEDURE — 80048 BASIC METABOLIC PNL TOTAL CA: CPT

## 2019-05-20 PROCEDURE — 80185 ASSAY OF PHENYTOIN TOTAL: CPT

## 2019-05-20 PROCEDURE — 6370000000 HC RX 637 (ALT 250 FOR IP): Performed by: HOSPITALIST

## 2019-05-20 PROCEDURE — 85027 COMPLETE CBC AUTOMATED: CPT

## 2019-05-20 RX ORDER — PHENYTOIN SODIUM 100 MG/1
300 CAPSULE, EXTENDED RELEASE ORAL 2 TIMES DAILY
Status: DISCONTINUED | OUTPATIENT
Start: 2019-05-20 | End: 2019-05-23 | Stop reason: HOSPADM

## 2019-05-20 RX ADMIN — FOSPHENYTOIN SODIUM 150 MG PE: 50 INJECTION INTRAMUSCULAR; INTRAVENOUS at 03:45

## 2019-05-20 RX ADMIN — CLOPIDOGREL BISULFATE 75 MG: 75 TABLET ORAL at 09:05

## 2019-05-20 RX ADMIN — PHENYTOIN SODIUM 300 MG: 100 CAPSULE, EXTENDED RELEASE ORAL at 09:05

## 2019-05-20 RX ADMIN — INSULIN GLARGINE 10 UNITS: 100 INJECTION, SOLUTION SUBCUTANEOUS at 22:06

## 2019-05-20 RX ADMIN — FLUOXETINE 20 MG: 20 CAPSULE ORAL at 09:04

## 2019-05-20 RX ADMIN — FAMOTIDINE 20 MG: 20 TABLET ORAL at 22:01

## 2019-05-20 RX ADMIN — FOLIC ACID 1 MG: 1 TABLET ORAL at 09:04

## 2019-05-20 RX ADMIN — PHENYTOIN SODIUM 300 MG: 100 CAPSULE, EXTENDED RELEASE ORAL at 22:04

## 2019-05-20 RX ADMIN — OXYBUTYNIN CHLORIDE 5 MG: 5 TABLET ORAL at 22:00

## 2019-05-20 RX ADMIN — SODIUM CHLORIDE, PRESERVATIVE FREE 10 ML: 5 INJECTION INTRAVENOUS at 09:04

## 2019-05-20 RX ADMIN — LISINOPRIL 10 MG: 10 TABLET ORAL at 09:05

## 2019-05-20 RX ADMIN — GABAPENTIN 100 MG: 100 CAPSULE ORAL at 09:04

## 2019-05-20 RX ADMIN — METOPROLOL SUCCINATE 25 MG: 25 TABLET, EXTENDED RELEASE ORAL at 09:05

## 2019-05-20 RX ADMIN — Medication 1 TABLET: at 09:04

## 2019-05-20 RX ADMIN — SODIUM CHLORIDE, PRESERVATIVE FREE 10 ML: 5 INJECTION INTRAVENOUS at 22:02

## 2019-05-20 RX ADMIN — GABAPENTIN 100 MG: 100 CAPSULE ORAL at 14:07

## 2019-05-20 RX ADMIN — FAMOTIDINE 20 MG: 20 TABLET ORAL at 09:04

## 2019-05-20 RX ADMIN — ASPIRIN 81 MG 324 MG: 81 TABLET ORAL at 09:05

## 2019-05-20 RX ADMIN — ATORVASTATIN CALCIUM 80 MG: 40 TABLET, FILM COATED ORAL at 21:59

## 2019-05-20 RX ADMIN — ENOXAPARIN SODIUM 40 MG: 40 INJECTION SUBCUTANEOUS at 09:04

## 2019-05-20 RX ADMIN — Medication 1 CAPSULE: at 09:04

## 2019-05-20 RX ADMIN — TAMSULOSIN HYDROCHLORIDE 0.4 MG: 0.4 CAPSULE ORAL at 09:05

## 2019-05-20 RX ADMIN — CEFTRIAXONE 1 G: 1 INJECTION, POWDER, FOR SOLUTION INTRAMUSCULAR; INTRAVENOUS at 00:30

## 2019-05-20 RX ADMIN — OXYBUTYNIN CHLORIDE 5 MG: 5 TABLET ORAL at 09:04

## 2019-05-20 RX ADMIN — OXYBUTYNIN CHLORIDE 5 MG: 5 TABLET ORAL at 14:07

## 2019-05-20 RX ADMIN — GABAPENTIN 100 MG: 100 CAPSULE ORAL at 22:00

## 2019-05-20 ASSESSMENT — PAIN SCALES - GENERAL
PAINLEVEL_OUTOF10: 0
PAINLEVEL_OUTOF10: 0

## 2019-05-20 NOTE — PROGRESS NOTES
injection vial 0-6 Units, 0-6 Units, Subcutaneous, TID WC  insulin lispro (HUMALOG) injection vial 0-3 Units, 0-3 Units, Subcutaneous, Nightly  glucose (GLUTOSE) 40 % oral gel 15 g, 15 g, Oral, PRN  dextrose 50 % solution 12.5 g, 12.5 g, Intravenous, PRN  glucagon (rDNA) injection 1 mg, 1 mg, Intramuscular, PRN  dextrose 5 % solution, 100 mL/hr, Intravenous, PRN  LORazepam (ATIVAN) injection 2 mg, 2 mg, Intravenous, Q6H PRN  Physical    VITALS:  BP (!) 148/82   Pulse 79   Temp 98.8 °F (37.1 °C) (Oral)   Resp 19   Ht 5' 3\" (1.6 m)   Wt (!) 393 lb 3.2 oz (178.4 kg)   SpO2 96%   BMI 69.65 kg/m²   Gen - A & O x 3  HEENT - PERRLA, EOMI  CV - rRR no M/g/R, normal s1, s2  Lungs - CTA bilaterally no W/C/R  Abd - soft, NT, ND  Ext - no cyanosis or edema    Data    CBC:   Lab Results   Component Value Date    WBC 7.2 05/20/2019    RBC 3.92 05/20/2019    HGB 10.0 05/20/2019    HCT 34.9 05/20/2019    MCV 89.0 05/20/2019    MCH 25.5 05/20/2019    MCHC 28.7 05/20/2019    RDW 15.4 05/20/2019     05/20/2019    MPV 8.9 05/20/2019       ASSESSMENT AND PLAN      Chad Sanchez is a 48 y.o.  male  who presents with altered mental status likely due to amphetamine use.       Metabolic encephalopathy secondary to urinary tract infection vs recreational drug use  Utox +ve for amphetamines  Wbc count 9.6 with left shift  CXR with cardiomegaly and vascular congestion, no PNA  Unable to get CT brain due to body weight  UA with wbc 111 and +ve nitrites  Admit to telemetry  Start Ceftriaxone  Follow urine Culture  Monitor mental status     Morbid obesity with functional quadriplegia - BMI is 69. NH resident.   May be getting drugs despite being in nursing home.       Seizures - low phenytoin level  - change dilatin to oral  -check phenytoin level tomorrow     Hx of stroke 2017  Cont ASA/Plavix/statin     DM with chronic complications and chronic insulin use  Glucose 159  Cont lantus  Hold metformin  Add ISS  Monitor CBG  Check Rishi George MD, MSc

## 2019-05-20 NOTE — DISCHARGE INSTR - COC
Continuity of Care Form    Patient Name: Bar Arguelles   :  1965  MRN:  [id number]    Admit date:  2019  Discharge date:  19    Code Status Order: Full Code   Advance Directives:   [facility] Documentation     Date/Time Healthcare Directive Type of Healthcare Directive Copy in [address] Agent's Name Healthcare Agent's Phone Number    [phone]  No, patient does not have an advance directive for healthcare treatment -- -- -- -- --          Admitting Physician:  Tracee Doe MD  PCP: Karen Godfrey MD    Discharging Nurse: Eliseo Barron 23 Unit/Room#: 6257  Discharging Unit Phone Number: [phone]    Emergency Contact:   Extended Emergency Contact Information  Primary Emergency Contact: Lesli Miles  Address: [address] [place] [address], [address] Phone: [phone]  Work Phone: [phone]  Mobile Phone: [phone]  Relation: Brother/Sister    Past Surgical History:  Past Surgical History:   Procedure Laterality Date    CORONARY ANGIOPLASTY WITH STENT PLACEMENT  3/19/2014    3/2014-PTCA with 1 stent to LAD prox and 2 stents to LAD mid- Dr Bravo Preston CATH LAB PROCEDURE  3/19/2014    3 stents placed in LAD    INSERTABLE CARDIAC MONITOR Left 2017    [employer] Reveal LINQ     OTHER SURGICAL HISTORY Right 10/23/15    groin I&D    PTCA  2014    PTCA and stent of CX       Immunization History: There is no immunization history on file for this patient.     Active Problems:  Patient Active Problem List   Diagnosis Code    Seizure ([facility] R56.9    NSTEMI (non-ST elevated myocardial infarction) ([facility] [facility] I21.4    Claudication ([facility] [facility] I73.9    S/P primary angioplasty with coronary stent Z95.5    Hyperlipemia E78.5    Coronary arteriosclerosis in native artery I25.10    Diabetes mellitus type 2, uncontrolled, without complications ([facility] [address] [facility] W68.73    Tobacco use disorder F17.200    Low back pain with sciatica M54.40    Left knee pain M25.562    Mixed hyperlipidemia E78.2    Hypertensive disorder I10    Renal stone N20.0    Poor hygiene R46.0    Angina pectoris, unstable (HCC) I20.0    Primary osteoarthritis of left knee M17.12    Primary osteoarthritis of right knee M17.11    Numbness R20.0    Cerebrovascular accident (CVA) ([facility]) I63.9    Weak R53.1    UTI (urinary tract infection) with pyuria N39.0    Fall at nursing home [address] Armaana Clause, Y92.129    Elevated troponin level R74.8    Diabetes mellitus ([facility] [address] E11.9    Class 3 obesity with body mass index (BMI) of 45.0 to 49.9 in adult [id number]    Staghorn renal calculus N20.0    Staghorn calculus N20.0    GERD (gastroesophageal reflux disease) K21.9    Cerebral infarction ([facility]) I63.9    Epileptic seizure ([facility]) G40.909    Functional diarrhea K59.1    Gastroesophageal reflux disease without esophagitis K21.9    History of percutaneous coronary intervention Z98.890    Muscle weakness M62.81    Secondary diabetes mellitus ([facility]) J93.7    Metabolic encephalopathy G88.06       Isolation/Infection:   Isolation          No Isolation            Nurse Assessment:  Last Vital Signs: BP (!) 143/67   Pulse 85   Temp 97.7 °F (36.5 °C) (Oral)   Resp 15   Ht 5' 3\" (1.6 m)   Wt (!) 383 lb 14.4 oz (174.1 kg)   SpO2 98%   BMI 68.00 kg/m²     Last documented pain score (0-10 scale): Pain Level: 0  Last Weight:   Wt Readings from Last 1 Encounters:   05/22/19 (!) 383 lb 14.4 oz (174.1 kg)     Mental Status:  oriented and alert    IV Access:  - None    Nursing Mobility/ADLs:  Walking   Assisted  Transfer  Assisted  Bathing  Assisted  Dressing  Assisted  Toileting  Assisted  Feeding  Assisted  Med Admin  Assisted  Med Delivery   whole    Wound Care Documentation and Therapy:  Wound 10/20/15 Other (Comment) Abdomen Lower;Right Hardened red area with a small area approximately 1 cm in diameter with a white center draining a small amount of bloody drainage (Active)   Number of days: 1311       Incision 10/23/15 Groin Right (Active)   Number of days: 1307        Elimination:  Continence:   · Bowel: No  · Bladder: Yes  Urinary Catheter: Removal Date 5/23/2019   Colostomy/Ileostomy/Ileal Conduit: No       Date of Last BM: 5/22/2019    Intake/Output Summary (Last 24 hours) at 5/23/2019 1152  Last data filed at 5/23/2019 0910  Gross per 24 hour   Intake --   Output 8125 ml   Net -8125 ml     I/O last 3 completed shifts: In: 240 [P.O.:240]  Out: 8525 [Urine:8525]    Safety Concerns: At Risk for Falls    Impairments/Disabilities:      None    Patient's personal belongings (please select all that are sent with patient):  None    RN SIGNATURE:  Electronically signed by Nely Romero RN on 5/23/19 at 11:34 AM    CASE MANAGEMENT/SOCIAL WORK SECTION    Inpatient Status Date: ***    Readmission Risk Assessment Score:  Readmission Risk              Risk of Unplanned Readmission:        22           Discharging to Facility/ Agency   · Name: [facility]  · Address: [place] [address]  · Phone: [phone]  · Fax: [phone]    / signature: Electronically signed by Garth Antonio RN on 5/22/19 at 9:57 AM    PHYSICIAN SECTION    Nutrition Therapy:  Current Nutrition Therapy:   - Oral Diet:  Carb Control 4 carbs/meal (1800kcals/day) and Cardiac    Routes of Feeding: Oral  Liquids: No Restrictions  Daily Fluid Restriction: no  Last Modified Barium Swallow with Video (Video Swallowing Test): not done    Treatments at the Time of Hospital Discharge:   Respiratory Treatments: no  Oxygen Therapy:  is on oxygen at 1-2 L/min per nasal cannula to keep Sat >92%.   Ventilator:    - CPAP 11.5 cmH2O only when sleeping    Rehab Therapies: Physical Therapy and Occupational Therapy  Weight Bearing Status/Restrictions: No weight bearing restirctions  Other Medical Equipment (for information only, NOT a DME order):  wheelchair, walker

## 2019-05-20 NOTE — PLAN OF CARE
Problem: Restraint Use - Nonviolent/Non-Self-Destructive Behavior:  Goal: Absence of restraint indications  Description  Absence of restraint indications  5/20/2019 0940 by Rebeca Cruz RN  Outcome: Met This Shift  5/20/2019 0939 by Rebeca Cruz RN  Outcome: Met This Shift  Goal: Absence of restraint-related injury  Description  Absence of restraint-related injury  5/20/2019 0940 by Rebeca Cruz RN  Outcome: Met This Shift  5/20/2019 0939 by Rebeca Cruz RN  Outcome: Met This Shift     Problem: Risk for Impaired Skin Integrity  Goal: Tissue integrity - skin and mucous membranes  Description  Structural intactness and normal physiological function of skin and  mucous membranes.   5/20/2019 0940 by Rebeca Cruz RN  Outcome: Ongoing  5/20/2019 0939 by Rebeca Cruz RN  Outcome: Ongoing     Problem: Falls - Risk of:  Goal: Will remain free from falls  Description  Will remain free from falls  5/20/2019 0940 by Rebeca rCuz RN  Outcome: Ongoing  5/20/2019 0939 by Rebeca Cruz RN  Outcome: Ongoing  Goal: Absence of physical injury  Description  Absence of physical injury  5/20/2019 0940 by Rebeca Cruz RN  Outcome: Ongoing  5/20/2019 0939 by Rebeca Cruz RN  Outcome: Ongoing     Problem: Coping:  Goal: Ability to remain calm will improve  Description  Ability to remain calm will improve  Outcome: Ongoing     Problem: Safety:  Goal: Ability to remain free from injury will improve  Description  Ability to remain free from injury will improve  Outcome: Ongoing     Problem: Self-Care:  Goal: Ability to participate in self-care as condition permits will improve  Description  Ability to participate in self-care as condition permits will improve  Outcome: Ongoing     Problem: Discharge Planning:  Goal: Discharged to appropriate level of care  Description  Discharged to appropriate level of care  Outcome: Ongoing

## 2019-05-20 NOTE — PLAN OF CARE
Problem: Restraint Use - Nonviolent/Non-Self-Destructive Behavior:  Goal: Absence of restraint indications  Description  Absence of restraint indications  Outcome: Met This Shift  Goal: Absence of restraint-related injury  Description  Absence of restraint-related injury  Outcome: Met This Shift

## 2019-05-21 ENCOUNTER — APPOINTMENT (OUTPATIENT)
Dept: GENERAL RADIOLOGY | Age: 54
DRG: 052 | End: 2019-05-21
Payer: MEDICAID

## 2019-05-21 LAB
ANION GAP SERPL CALCULATED.3IONS-SCNC: 8 MMOL/L (ref 4–16)
BASE EXCESS MIXED: ABNORMAL (ref 0–1.2)
BUN BLDV-MCNC: 12 MG/DL (ref 6–23)
CALCIUM SERPL-MCNC: 8.6 MG/DL (ref 8.3–10.6)
CHLORIDE BLD-SCNC: 101 MMOL/L (ref 99–110)
CO2: 34 MMOL/L (ref 21–32)
CREAT SERPL-MCNC: 0.6 MG/DL (ref 0.9–1.3)
CULTURE: ABNORMAL
CULTURE: ABNORMAL
DOSE AMOUNT: ABNORMAL
DOSE TIME: ABNORMAL
ESTIMATED AVERAGE GLUCOSE: 128 MG/DL
GFR AFRICAN AMERICAN: >60 ML/MIN/1.73M2
GFR NON-AFRICAN AMERICAN: >60 ML/MIN/1.73M2
GLUCOSE BLD-MCNC: 108 MG/DL (ref 70–99)
GLUCOSE BLD-MCNC: 111 MG/DL (ref 70–99)
GLUCOSE BLD-MCNC: 136 MG/DL (ref 70–99)
GLUCOSE BLD-MCNC: 157 MG/DL (ref 70–99)
GLUCOSE BLD-MCNC: 98 MG/DL (ref 70–99)
HBA1C MFR BLD: 6.1 % (ref 4.2–6.3)
HCO3 VENOUS: 36.1 MMOL/L (ref 19–25)
HCT VFR BLD CALC: 35.1 % (ref 42–52)
HEMOGLOBIN: 10.3 GM/DL (ref 13.5–18)
Lab: ABNORMAL
MCH RBC QN AUTO: 25.6 PG (ref 27–31)
MCHC RBC AUTO-ENTMCNC: 29.3 % (ref 32–36)
MCV RBC AUTO: 87.3 FL (ref 78–100)
O2 SAT, VEN: 90.1 % (ref 50–70)
PCO2, VEN: 75 MMHG (ref 38–52)
PDW BLD-RTO: 14.7 % (ref 11.7–14.9)
PH VENOUS: 7.29 (ref 7.32–7.42)
PHENYTOIN % FREE: ABNORMAL % (ref 8–14)
PHENYTOIN % FREE: ABNORMAL % (ref 8–14)
PHENYTOIN DOSE AMOUNT: ABNORMAL
PHENYTOIN DOSE FREQUENCY: ABNORMAL
PHENYTOIN DOSE ROUTE: ABNORMAL
PHENYTOIN FREE: <0.5 UG/ML (ref 1–2.5)
PHENYTOIN LEVEL: 4 UG/ML (ref 10–20)
PHENYTOIN TYPE OF DRAW: ABNORMAL
PLATELET # BLD: 261 K/CU MM (ref 140–440)
PMV BLD AUTO: 8.7 FL (ref 7.5–11.1)
PO2, VEN: 66 MMHG (ref 28–48)
POTASSIUM SERPL-SCNC: 4 MMOL/L (ref 3.5–5.1)
RBC # BLD: 4.02 M/CU MM (ref 4.6–6.2)
SODIUM BLD-SCNC: 143 MMOL/L (ref 135–145)
SPECIMEN: ABNORMAL
TOTAL COLONY COUNT: ABNORMAL
TOTAL PHENYTOIN: 1 UG/ML (ref 10–20)
TOTAL PHENYTOIN: ABNORMAL UG/ML (ref 10–20)
WBC # BLD: 9 K/CU MM (ref 4–10.5)

## 2019-05-21 PROCEDURE — 6370000000 HC RX 637 (ALT 250 FOR IP): Performed by: INTERNAL MEDICINE

## 2019-05-21 PROCEDURE — 82962 GLUCOSE BLOOD TEST: CPT

## 2019-05-21 PROCEDURE — 6370000000 HC RX 637 (ALT 250 FOR IP): Performed by: HOSPITALIST

## 2019-05-21 PROCEDURE — 2140000000 HC CCU INTERMEDIATE R&B

## 2019-05-21 PROCEDURE — 2580000003 HC RX 258: Performed by: HOSPITALIST

## 2019-05-21 PROCEDURE — 80048 BASIC METABOLIC PNL TOTAL CA: CPT

## 2019-05-21 PROCEDURE — 85027 COMPLETE CBC AUTOMATED: CPT

## 2019-05-21 PROCEDURE — 94660 CPAP INITIATION&MGMT: CPT

## 2019-05-21 PROCEDURE — 83036 HEMOGLOBIN GLYCOSYLATED A1C: CPT

## 2019-05-21 PROCEDURE — 82805 BLOOD GASES W/O2 SATURATION: CPT

## 2019-05-21 PROCEDURE — 71045 X-RAY EXAM CHEST 1 VIEW: CPT

## 2019-05-21 PROCEDURE — 6360000002 HC RX W HCPCS: Performed by: HOSPITALIST

## 2019-05-21 PROCEDURE — 36415 COLL VENOUS BLD VENIPUNCTURE: CPT

## 2019-05-21 PROCEDURE — 80185 ASSAY OF PHENYTOIN TOTAL: CPT

## 2019-05-21 RX ORDER — FUROSEMIDE 10 MG/ML
40 INJECTION INTRAMUSCULAR; INTRAVENOUS ONCE
Status: DISCONTINUED | OUTPATIENT
Start: 2019-05-21 | End: 2019-05-21

## 2019-05-21 RX ORDER — CEPHALEXIN 250 MG/1
500 CAPSULE ORAL EVERY 6 HOURS SCHEDULED
Status: DISCONTINUED | OUTPATIENT
Start: 2019-05-21 | End: 2019-05-23 | Stop reason: HOSPADM

## 2019-05-21 RX ORDER — FUROSEMIDE 40 MG/1
40 TABLET ORAL 2 TIMES DAILY
Status: DISCONTINUED | OUTPATIENT
Start: 2019-05-22 | End: 2019-05-23 | Stop reason: HOSPADM

## 2019-05-21 RX ORDER — FUROSEMIDE 40 MG/1
40 TABLET ORAL DAILY
Status: DISCONTINUED | OUTPATIENT
Start: 2019-05-21 | End: 2019-05-21

## 2019-05-21 RX ADMIN — PHENYTOIN SODIUM 300 MG: 100 CAPSULE, EXTENDED RELEASE ORAL at 09:35

## 2019-05-21 RX ADMIN — CLOPIDOGREL BISULFATE 75 MG: 75 TABLET ORAL at 09:35

## 2019-05-21 RX ADMIN — INSULIN LISPRO 1 UNITS: 100 INJECTION, SOLUTION INTRAVENOUS; SUBCUTANEOUS at 12:38

## 2019-05-21 RX ADMIN — CYCLOBENZAPRINE HYDROCHLORIDE 10 MG: 10 TABLET, FILM COATED ORAL at 22:39

## 2019-05-21 RX ADMIN — METOPROLOL SUCCINATE 25 MG: 25 TABLET, EXTENDED RELEASE ORAL at 09:35

## 2019-05-21 RX ADMIN — OXYBUTYNIN CHLORIDE 5 MG: 5 TABLET ORAL at 22:39

## 2019-05-21 RX ADMIN — FLUOXETINE 20 MG: 20 CAPSULE ORAL at 09:35

## 2019-05-21 RX ADMIN — GABAPENTIN 100 MG: 100 CAPSULE ORAL at 13:44

## 2019-05-21 RX ADMIN — OXYBUTYNIN CHLORIDE 5 MG: 5 TABLET ORAL at 09:34

## 2019-05-21 RX ADMIN — DOCUSATE SODIUM 100 MG: 100 CAPSULE, LIQUID FILLED ORAL at 09:34

## 2019-05-21 RX ADMIN — GABAPENTIN 100 MG: 100 CAPSULE ORAL at 09:35

## 2019-05-21 RX ADMIN — ATORVASTATIN CALCIUM 80 MG: 40 TABLET, FILM COATED ORAL at 22:40

## 2019-05-21 RX ADMIN — ASPIRIN 81 MG 324 MG: 81 TABLET ORAL at 09:34

## 2019-05-21 RX ADMIN — TAMSULOSIN HYDROCHLORIDE 0.4 MG: 0.4 CAPSULE ORAL at 09:35

## 2019-05-21 RX ADMIN — FAMOTIDINE 20 MG: 20 TABLET ORAL at 22:40

## 2019-05-21 RX ADMIN — OXYBUTYNIN CHLORIDE 5 MG: 5 TABLET ORAL at 13:44

## 2019-05-21 RX ADMIN — Medication 1 CAPSULE: at 09:34

## 2019-05-21 RX ADMIN — DOCUSATE SODIUM 100 MG: 100 CAPSULE, LIQUID FILLED ORAL at 22:40

## 2019-05-21 RX ADMIN — FUROSEMIDE 40 MG: 40 TABLET ORAL at 12:36

## 2019-05-21 RX ADMIN — GABAPENTIN 100 MG: 100 CAPSULE ORAL at 22:40

## 2019-05-21 RX ADMIN — CEFTRIAXONE 1 G: 1 INJECTION, POWDER, FOR SOLUTION INTRAMUSCULAR; INTRAVENOUS at 00:53

## 2019-05-21 RX ADMIN — PHENYTOIN SODIUM 300 MG: 100 CAPSULE, EXTENDED RELEASE ORAL at 22:39

## 2019-05-21 RX ADMIN — ONDANSETRON 4 MG: 2 INJECTION INTRAMUSCULAR; INTRAVENOUS at 01:41

## 2019-05-21 RX ADMIN — LISINOPRIL 10 MG: 10 TABLET ORAL at 09:34

## 2019-05-21 RX ADMIN — Medication 1 TABLET: at 09:34

## 2019-05-21 RX ADMIN — FAMOTIDINE 20 MG: 20 TABLET ORAL at 09:35

## 2019-05-21 RX ADMIN — CEPHALEXIN 500 MG: 250 CAPSULE ORAL at 17:27

## 2019-05-21 RX ADMIN — FOLIC ACID 1 MG: 1 TABLET ORAL at 09:35

## 2019-05-21 RX ADMIN — CEPHALEXIN 500 MG: 250 CAPSULE ORAL at 12:36

## 2019-05-21 RX ADMIN — ENOXAPARIN SODIUM 40 MG: 40 INJECTION SUBCUTANEOUS at 09:35

## 2019-05-21 ASSESSMENT — PAIN SCALES - GENERAL
PAINLEVEL_OUTOF10: 0
PAINLEVEL_OUTOF10: 0

## 2019-05-21 NOTE — PROGRESS NOTES
I met with patient on 5/21/19 for bedside tobacco rounding. Johnathon Glass stated that he chews about 1 can per day. I explained that [facility] cares about his health and advised him to quit. Johnathon Glass stated that he would like to quit, but will do it cold turkey on his own. Johnathon Glass is not using the nicotine patch and not having intense cravings. We discussed health concerns, reported by the patient-diabetes-and the benefits of quitting. We discussed building a quit plan, identifying triggers, ways to fight cravings, modifying behaviors and building a quit kit to assist. I explained the [facility] cessation program, provided educational information, and strongly encouraged Johnathon Glass to contact me for outpatient services. I also explained the [address].       César Hobbs, Certified Tobacco Treatment Specialist, [facility]

## 2019-05-21 NOTE — PROGRESS NOTES
lacrimal glands appear unremarkable. No retrobulbar hematoma or mass is seen. The orbital walls and rims are intact. SINUSES/MASTOIDS:  The paranasal sinuses and mastoid air cells are well aerated. No acute fracture is seen. SOFT TISSUES:  No appreciable facial soft tissue swelling is seen. No acute traumatic injury of the facial bones. No acute orbital abnormality. Xr Chest Portable    Result Date: 5/21/2019  EXAMINATION: ONE XRAY VIEW OF THE CHEST 5/21/2019 11:18 am COMPARISON: 5/18/2019 HISTORY: ORDERING SYSTEM PROVIDED HISTORY: hypoxia TECHNOLOGIST PROVIDED HISTORY: Reason for exam:->hypoxia Ordering Physician Provided Reason for Exam: hypoxia Acuity: Unknown Type of Exam: Unknown Initial exam FINDINGS: Persistent vascular congestion pattern. Stable cardiomediastinal silhouette. Cardiomegaly with vascular congestion. Xr Chest Portable    Result Date: 5/18/2019  EXAMINATION: ONE XRAY VIEW OF THE CHEST 5/18/2019 9:34 pm COMPARISON: Chest radiograph 09/01/2018. HISTORY: ORDERING SYSTEM PROVIDED HISTORY: ams TECHNOLOGIST PROVIDED HISTORY: Reason for exam:->ams Ordering Physician Provided Reason for Exam:  AMS Acuity: Unknown Type of Exam: Initial Additional signs and symptoms: NOT RESPONSIVE Relevant Medical/Surgical History: CAD FINDINGS: A cardiac event recorder is in place. The cardiac silhouette is enlarged but stable. Mild vascular congestion. No pneumothorax, consolidation, or pleural effusion. No acute osseous abnormality. Mildly enlarged cardiac silhouette and pulmonary vascular congestion. Telemetry EKG strip was personally reviewed. Assessment/Plan:     Acute metabolic encephalopathy with Klebsiella UTI vs amphetamines, and possibly from respiratory failure. - continue to treat UTI with rocephin.   - CT brain without acute abnormalities. - avoid sedatives.      Acute hypoxic and hypercapnic respiratory failure  Acute diastolic CHF  Obesity related hypoventilation   - BiPAP  - diurese with lasix 40mg po bid. - BMP daily. Seizure disorder  - Continue Dilantin    H/o CVA  - continue plavix, high dose atorvastatin, foltx. Type 2 DM on insulin  - continue Lantus and humalog sliding scale. DVT prophylaxis - Lovenox.         Alen Spencer MD  5/21/2019 4:31 PM

## 2019-05-21 NOTE — PROGRESS NOTES
05/21/19 0525   NIV Type   $NIV $Daily Charge   NIV Started Yes   Equipment Type Autopap   Mode CPAP   Mask Type Full face mask   Mask Size Large   Bonnet size Large   Settings/Measurements   Comfort Level Good   Using Accessory Muscles No   CPAP 11.5 cmH2O   Rate Ordered 16   Resp 16   SpO2 95   O2 Flow Rate (L/min) 7 L/min   Vt Exhaled 608 mL   Mask Leak (lpm) 1 lpm   Patient Observation   Observations Pt sleeping

## 2019-05-21 NOTE — PLAN OF CARE
Problem: Restraint Use - Nonviolent/Non-Self-Destructive Behavior:  Goal: Absence of restraint indications  Description  Absence of restraint indications  Outcome: Ongoing  Goal: Absence of restraint-related injury  Description  Absence of restraint-related injury  Outcome: Ongoing     Problem: Risk for Impaired Skin Integrity  Goal: Tissue integrity - skin and mucous membranes  Description  Structural intactness and normal physiological function of skin and  mucous membranes.   Outcome: Ongoing     Problem: Falls - Risk of:  Goal: Will remain free from falls  Description  Will remain free from falls  Outcome: Ongoing  Goal: Absence of physical injury  Description  Absence of physical injury  Outcome: Ongoing     Problem: Coping:  Goal: Ability to remain calm will improve  Description  Ability to remain calm will improve  Outcome: Ongoing     Problem: Safety:  Goal: Ability to remain free from injury will improve  Description  Ability to remain free from injury will improve  Outcome: Ongoing     Problem: Self-Care:  Goal: Ability to participate in self-care as condition permits will improve  Description  Ability to participate in self-care as condition permits will improve  Outcome: Ongoing     Problem: Discharge Planning:  Goal: Discharged to appropriate level of care  Description  Discharged to appropriate level of care  Outcome: Ongoing

## 2019-05-22 LAB
ANION GAP SERPL CALCULATED.3IONS-SCNC: 10 MMOL/L (ref 4–16)
BUN BLDV-MCNC: 10 MG/DL (ref 6–23)
CALCIUM SERPL-MCNC: 8.7 MG/DL (ref 8.3–10.6)
CHLORIDE BLD-SCNC: 99 MMOL/L (ref 99–110)
CO2: 33 MMOL/L (ref 21–32)
CREAT SERPL-MCNC: 0.7 MG/DL (ref 0.9–1.3)
GFR AFRICAN AMERICAN: >60 ML/MIN/1.73M2
GFR NON-AFRICAN AMERICAN: >60 ML/MIN/1.73M2
GLUCOSE BLD-MCNC: 119 MG/DL (ref 70–99)
GLUCOSE BLD-MCNC: 127 MG/DL (ref 70–99)
GLUCOSE BLD-MCNC: 153 MG/DL (ref 70–99)
GLUCOSE BLD-MCNC: 85 MG/DL (ref 70–99)
GLUCOSE BLD-MCNC: 91 MG/DL (ref 70–99)
HCT VFR BLD CALC: 33.7 % (ref 42–52)
HEMOGLOBIN: 9.7 GM/DL (ref 13.5–18)
MCH RBC QN AUTO: 25.5 PG (ref 27–31)
MCHC RBC AUTO-ENTMCNC: 28.8 % (ref 32–36)
MCV RBC AUTO: 88.5 FL (ref 78–100)
PDW BLD-RTO: 14.9 % (ref 11.7–14.9)
PLATELET # BLD: 239 K/CU MM (ref 140–440)
PMV BLD AUTO: 8.7 FL (ref 7.5–11.1)
POTASSIUM SERPL-SCNC: 3.6 MMOL/L (ref 3.5–5.1)
PRO-BNP: 748.2 PG/ML
RBC # BLD: 3.81 M/CU MM (ref 4.6–6.2)
SODIUM BLD-SCNC: 142 MMOL/L (ref 135–145)
WBC # BLD: 9.4 K/CU MM (ref 4–10.5)

## 2019-05-22 PROCEDURE — 80048 BASIC METABOLIC PNL TOTAL CA: CPT

## 2019-05-22 PROCEDURE — 6370000000 HC RX 637 (ALT 250 FOR IP): Performed by: INTERNAL MEDICINE

## 2019-05-22 PROCEDURE — 94660 CPAP INITIATION&MGMT: CPT

## 2019-05-22 PROCEDURE — 36415 COLL VENOUS BLD VENIPUNCTURE: CPT

## 2019-05-22 PROCEDURE — 94761 N-INVAS EAR/PLS OXIMETRY MLT: CPT

## 2019-05-22 PROCEDURE — 2140000000 HC CCU INTERMEDIATE R&B

## 2019-05-22 PROCEDURE — 6370000000 HC RX 637 (ALT 250 FOR IP): Performed by: HOSPITALIST

## 2019-05-22 PROCEDURE — 6360000002 HC RX W HCPCS: Performed by: HOSPITALIST

## 2019-05-22 PROCEDURE — 82962 GLUCOSE BLOOD TEST: CPT

## 2019-05-22 PROCEDURE — 83880 ASSAY OF NATRIURETIC PEPTIDE: CPT

## 2019-05-22 PROCEDURE — 85027 COMPLETE CBC AUTOMATED: CPT

## 2019-05-22 RX ORDER — ACETAMINOPHEN 325 MG/1
650 TABLET ORAL EVERY 4 HOURS PRN
Status: DISCONTINUED | OUTPATIENT
Start: 2019-05-22 | End: 2019-05-23 | Stop reason: HOSPADM

## 2019-05-22 RX ADMIN — GABAPENTIN 100 MG: 100 CAPSULE ORAL at 09:34

## 2019-05-22 RX ADMIN — TAMSULOSIN HYDROCHLORIDE 0.4 MG: 0.4 CAPSULE ORAL at 09:33

## 2019-05-22 RX ADMIN — CEPHALEXIN 500 MG: 250 CAPSULE ORAL at 06:47

## 2019-05-22 RX ADMIN — FOLIC ACID 1 MG: 1 TABLET ORAL at 09:34

## 2019-05-22 RX ADMIN — INSULIN GLARGINE 10 UNITS: 100 INJECTION, SOLUTION SUBCUTANEOUS at 21:12

## 2019-05-22 RX ADMIN — FAMOTIDINE 20 MG: 20 TABLET ORAL at 21:12

## 2019-05-22 RX ADMIN — OXYBUTYNIN CHLORIDE 5 MG: 5 TABLET ORAL at 13:58

## 2019-05-22 RX ADMIN — ASPIRIN 81 MG 324 MG: 81 TABLET ORAL at 09:33

## 2019-05-22 RX ADMIN — GABAPENTIN 100 MG: 100 CAPSULE ORAL at 13:58

## 2019-05-22 RX ADMIN — PHENYTOIN SODIUM 300 MG: 100 CAPSULE, EXTENDED RELEASE ORAL at 09:34

## 2019-05-22 RX ADMIN — PHENYTOIN SODIUM 300 MG: 100 CAPSULE, EXTENDED RELEASE ORAL at 21:12

## 2019-05-22 RX ADMIN — FAMOTIDINE 20 MG: 20 TABLET ORAL at 09:33

## 2019-05-22 RX ADMIN — ATORVASTATIN CALCIUM 80 MG: 40 TABLET, FILM COATED ORAL at 21:12

## 2019-05-22 RX ADMIN — ENOXAPARIN SODIUM 40 MG: 40 INJECTION SUBCUTANEOUS at 09:32

## 2019-05-22 RX ADMIN — CLOPIDOGREL BISULFATE 75 MG: 75 TABLET ORAL at 09:33

## 2019-05-22 RX ADMIN — LISINOPRIL 10 MG: 10 TABLET ORAL at 09:33

## 2019-05-22 RX ADMIN — OXYBUTYNIN CHLORIDE 5 MG: 5 TABLET ORAL at 21:12

## 2019-05-22 RX ADMIN — FUROSEMIDE 40 MG: 40 TABLET ORAL at 17:01

## 2019-05-22 RX ADMIN — GABAPENTIN 100 MG: 100 CAPSULE ORAL at 21:12

## 2019-05-22 RX ADMIN — CEPHALEXIN 500 MG: 250 CAPSULE ORAL at 11:45

## 2019-05-22 RX ADMIN — Medication 1 TABLET: at 09:34

## 2019-05-22 RX ADMIN — METOPROLOL SUCCINATE 25 MG: 25 TABLET, EXTENDED RELEASE ORAL at 09:34

## 2019-05-22 RX ADMIN — INSULIN LISPRO 1 UNITS: 100 INJECTION, SOLUTION INTRAVENOUS; SUBCUTANEOUS at 21:11

## 2019-05-22 RX ADMIN — FLUOXETINE 20 MG: 20 CAPSULE ORAL at 09:34

## 2019-05-22 RX ADMIN — OXYBUTYNIN CHLORIDE 5 MG: 5 TABLET ORAL at 09:34

## 2019-05-22 RX ADMIN — CEPHALEXIN 500 MG: 250 CAPSULE ORAL at 17:01

## 2019-05-22 NOTE — PLAN OF CARE
Problem: Restraint Use - Nonviolent/Non-Self-Destructive Behavior:  Goal: Absence of restraint indications  Outcome: Ongoing  Goal: Absence of restraint-related injury  Outcome: Ongoing     Problem: Risk for Impaired Skin Integrity  Goal: Tissue integrity - skin and mucous membranes  Outcome: Ongoing     Problem: Falls - Risk of:  Goal: Will remain free from falls  Outcome: Ongoing  Goal: Absence of physical injury  Outcome: Ongoing     Problem: Coping:  Goal: Ability to remain calm will improve  Outcome: Ongoing     Problem: Safety:  Goal: Ability to remain free from injury will improve  Outcome: Ongoing     Problem: Self-Care:  Goal: Ability to participate in self-care as condition permits will improve  Outcome: Ongoing     Problem: Discharge Planning:  Goal: Discharged to appropriate level of care  Outcome: Ongoing

## 2019-05-23 VITALS
WEIGHT: 315 LBS | DIASTOLIC BLOOD PRESSURE: 86 MMHG | HEIGHT: 63 IN | SYSTOLIC BLOOD PRESSURE: 159 MMHG | BODY MASS INDEX: 55.81 KG/M2 | RESPIRATION RATE: 15 BRPM | OXYGEN SATURATION: 98 % | HEART RATE: 85 BPM | TEMPERATURE: 98.5 F

## 2019-05-23 LAB
ANION GAP SERPL CALCULATED.3IONS-SCNC: 12 MMOL/L (ref 4–16)
BUN BLDV-MCNC: 8 MG/DL (ref 6–23)
CALCIUM SERPL-MCNC: 9.3 MG/DL (ref 8.3–10.6)
CHLORIDE BLD-SCNC: 96 MMOL/L (ref 99–110)
CO2: 34 MMOL/L (ref 21–32)
CREAT SERPL-MCNC: 0.7 MG/DL (ref 0.9–1.3)
GFR AFRICAN AMERICAN: >60 ML/MIN/1.73M2
GFR NON-AFRICAN AMERICAN: >60 ML/MIN/1.73M2
GLUCOSE BLD-MCNC: 114 MG/DL (ref 70–99)
GLUCOSE BLD-MCNC: 135 MG/DL (ref 70–99)
GLUCOSE BLD-MCNC: 82 MG/DL (ref 70–99)
GLUCOSE BLD-MCNC: 84 MG/DL (ref 70–99)
GLUCOSE BLD-MCNC: 90 MG/DL (ref 70–99)
MAGNESIUM: 1.3 MG/DL (ref 1.8–2.4)
POTASSIUM SERPL-SCNC: 3.3 MMOL/L (ref 3.5–5.1)
SODIUM BLD-SCNC: 142 MMOL/L (ref 135–145)

## 2019-05-23 PROCEDURE — 83735 ASSAY OF MAGNESIUM: CPT

## 2019-05-23 PROCEDURE — 6370000000 HC RX 637 (ALT 250 FOR IP): Performed by: HOSPITALIST

## 2019-05-23 PROCEDURE — 82962 GLUCOSE BLOOD TEST: CPT

## 2019-05-23 PROCEDURE — 6370000000 HC RX 637 (ALT 250 FOR IP): Performed by: INTERNAL MEDICINE

## 2019-05-23 PROCEDURE — 6360000002 HC RX W HCPCS: Performed by: HOSPITALIST

## 2019-05-23 PROCEDURE — 80048 BASIC METABOLIC PNL TOTAL CA: CPT

## 2019-05-23 RX ORDER — POTASSIUM CHLORIDE 20 MEQ/1
40 TABLET, EXTENDED RELEASE ORAL ONCE
Status: COMPLETED | OUTPATIENT
Start: 2019-05-23 | End: 2019-05-23

## 2019-05-23 RX ORDER — CEPHALEXIN 500 MG/1
500 CAPSULE ORAL 4 TIMES DAILY
Qty: 20 CAPSULE | Status: CANCELLED
Start: 2019-05-23

## 2019-05-23 RX ORDER — POTASSIUM CHLORIDE 750 MG/1
10 TABLET, EXTENDED RELEASE ORAL 2 TIMES DAILY
Qty: 180 TABLET | Refills: 1 | DISCHARGE
Start: 2019-05-23

## 2019-05-23 RX ORDER — MAGNESIUM OXIDE 400 MG/1
400 TABLET ORAL 2 TIMES DAILY
Qty: 30 TABLET | Refills: 1 | Status: ON HOLD | DISCHARGE
Start: 2019-05-23 | End: 2019-09-04 | Stop reason: HOSPADM

## 2019-05-23 RX ORDER — MAGNESIUM SULFATE 4 G/50ML
4 INJECTION INTRAVENOUS ONCE
Status: DISCONTINUED | OUTPATIENT
Start: 2019-05-23 | End: 2019-05-23

## 2019-05-23 RX ORDER — CEPHALEXIN 500 MG/1
500 CAPSULE ORAL 4 TIMES DAILY
DISCHARGE
Start: 2019-05-23 | End: 2019-06-02

## 2019-05-23 RX ORDER — FUROSEMIDE 40 MG/1
40 TABLET ORAL DAILY
Qty: 60 TABLET | Refills: 3 | DISCHARGE
Start: 2019-05-23

## 2019-05-23 RX ADMIN — CEPHALEXIN 500 MG: 250 CAPSULE ORAL at 00:13

## 2019-05-23 RX ADMIN — GABAPENTIN 100 MG: 100 CAPSULE ORAL at 14:44

## 2019-05-23 RX ADMIN — FUROSEMIDE 40 MG: 40 TABLET ORAL at 09:31

## 2019-05-23 RX ADMIN — CLOPIDOGREL BISULFATE 75 MG: 75 TABLET ORAL at 09:30

## 2019-05-23 RX ADMIN — METOPROLOL SUCCINATE 25 MG: 25 TABLET, EXTENDED RELEASE ORAL at 09:31

## 2019-05-23 RX ADMIN — OXYBUTYNIN CHLORIDE 5 MG: 5 TABLET ORAL at 09:31

## 2019-05-23 RX ADMIN — PHENYTOIN SODIUM 300 MG: 100 CAPSULE, EXTENDED RELEASE ORAL at 09:30

## 2019-05-23 RX ADMIN — FAMOTIDINE 20 MG: 20 TABLET ORAL at 09:31

## 2019-05-23 RX ADMIN — POTASSIUM CHLORIDE 40 MEQ: 20 TABLET, EXTENDED RELEASE ORAL at 09:30

## 2019-05-23 RX ADMIN — MAGNESIUM OXIDE TAB 400 MG (241.3 MG ELEMENTAL MG) 400 MG: 400 (241.3 MG) TAB at 12:39

## 2019-05-23 RX ADMIN — FLUOXETINE 20 MG: 20 CAPSULE ORAL at 09:31

## 2019-05-23 RX ADMIN — LISINOPRIL 10 MG: 10 TABLET ORAL at 09:31

## 2019-05-23 RX ADMIN — Medication 1 CAPSULE: at 09:30

## 2019-05-23 RX ADMIN — DOCUSATE SODIUM 100 MG: 100 CAPSULE, LIQUID FILLED ORAL at 09:31

## 2019-05-23 RX ADMIN — FOLIC ACID 1 MG: 1 TABLET ORAL at 09:31

## 2019-05-23 RX ADMIN — OXYBUTYNIN CHLORIDE 5 MG: 5 TABLET ORAL at 14:44

## 2019-05-23 RX ADMIN — CEPHALEXIN 500 MG: 250 CAPSULE ORAL at 06:41

## 2019-05-23 RX ADMIN — GABAPENTIN 100 MG: 100 CAPSULE ORAL at 09:31

## 2019-05-23 RX ADMIN — ASPIRIN 81 MG 324 MG: 81 TABLET ORAL at 09:30

## 2019-05-23 RX ADMIN — TAMSULOSIN HYDROCHLORIDE 0.4 MG: 0.4 CAPSULE ORAL at 09:30

## 2019-05-23 RX ADMIN — CEPHALEXIN 500 MG: 250 CAPSULE ORAL at 12:39

## 2019-05-23 RX ADMIN — Medication 1 TABLET: at 09:31

## 2019-05-23 RX ADMIN — ENOXAPARIN SODIUM 40 MG: 40 INJECTION SUBCUTANEOUS at 09:30

## 2019-05-23 NOTE — CARE COORDINATION
Transport arranged with [facility] per pt request. ETA is 1700 d/t pt needs bariatric transport and this is the soonest they can  pt. Notified pt, pt's nurse, and Barby/Cali Westerlo via . Pt will notify family. AVS faxed and placed in packet.    TE

## 2019-05-23 NOTE — DISCHARGE SUMMARY
continue plavix, high dose atorvastatin, foltx.       Type 2 DM on insulin  - continue Lantus and humalog sliding scale. Physical Examination upon discharge:   Pt was personally examined by me on the day of discharge with the following findings:  BP (!) 143/67   Pulse 85   Temp 97.7 °F (36.5 °C) (Oral)   Resp 15   Ht 5' 3\" (1.6 m)   Wt (!) 383 lb 14.4 oz (174.1 kg)   SpO2 98%   BMI 68.00 kg/m²   General: The patient appears as stated age. Well appearing, and in no distress. Mental status: Alert, Oriented x3. Coherent. No agitation. Eyes: SANDIE. Normal conjunctiva. ENT/Mouth: normal appearing jaw and neck, no neck nodes or sinus tenderness. Clear oropharynx with moist mucous membrane. Cardiovascular:  normal rate, regular rhythm, normal S1, S2, no murmurs, rubs, clicks or gallops. No peripheral edema. Dorsal pedis pulses 2+ bilaterally. Respiratory: clear to auscultation, no wheezes, rales or rhonchi, symmetric air entry. Gastrointestinal: soft, nontender, nondistended, no masses or organomegaly. Genitourinary:  No CVA tenderness. Musculoskletal:  no clubbing or cyanosis. No joint swelling, warmth, or tenderness. Skin:  normal coloration and turgor, no rashes, no suspicious skin lesions noted.     Condition at the time of discharge:  Stable  Disposition: [facility]  Discharge FOLLOW UP  Kirit Lincoln MD in approximately 1 week    Discharge Medications:       Laura, [address] [place] [address],[address] Medication Instructions OEL:[id number]    Printed on:05/23/19 1401   Medication Information                      acetaminophen (TYLENOL) 325 MG tablet  Take 2 tablets by mouth every 4 hours as needed for Pain             aspirin 81 MG chewable tablet  Take 4 tablets by mouth daily             atorvastatin (LIPITOR) 80 MG tablet  Take 1 tablet by mouth nightly             b complex vitamins capsule  Take 1 capsule by mouth daily             cephALEXin (KEFLEX) 500 MG capsule  Take 1 times daily             tamsulosin (FLOMAX) 0.4 MG capsule  Take 1 capsule by mouth daily                 Consults:  IP CONSULT TO HOSPITALIST    Significant Procedures:  none    Significant Diagnostic Studies:   Lab Results   Component Value Date    WBC 9.4 05/22/2019    HGB 9.7 (L) 05/22/2019    HCT 33.7 (L) 05/22/2019    MCV 88.5 05/22/2019     05/22/2019     Lab Results   Component Value Date     05/23/2019    K 3.3 (L) 05/23/2019    CL 96 (L) 05/23/2019    CO2 34 (H) 05/23/2019    BUN 8 05/23/2019    CREATININE 0.7 (L) 05/23/2019    GLUCOSE 82 05/23/2019    CALCIUM 9.3 05/23/2019    PROT 7.1 05/18/2019    LABALBU 3.7 05/18/2019    BILITOT 0.2 05/18/2019    ALKPHOS 169 (H) 05/18/2019    AST 9 (L) 05/18/2019    ALT 10 05/18/2019    LABGLOM >60 05/23/2019    GFRAA >60 05/23/2019       Ct Abdomen Pelvis Wo Contrast Additional Contrast? None    Result Date: 5/19/2019  EXAMINATION: CT OF THE ABDOMEN AND PELVIS WITHOUT CONTRAST 5/19/2019 2:42 pm TECHNIQUE: CT of the abdomen and pelvis was performed without the administration of intravenous contrast. Multiplanar reformatted images are provided for review. Dose modulation, iterative reconstruction, and/or weight based adjustment of the mA/kV was utilized to reduce the radiation dose to as low as reasonably achievable. COMPARISON: 09/01/2018 HISTORY: Altered mental status and acute abdominal pain. Possible drug abuse. FINDINGS: Evaluation is suboptimal secondary to body habitus. Lower Chest: Small pleural effusions, right greater than left, with adjacent compressive atelectasis. Extensive coronary artery calcification. Borderline cardiomegaly. Organs: No calcified gallstones. Liver, spleen, and adrenals are unremarkable. Fatty atrophy of the pancreas. Unchanged staghorn type calculus is in the upper pole of the right kidney measuring 5.1 x 3.7 cm with additional stones in the lower pole of the right kidney.   Mild hydronephrosis appears slightly decreased. No urinary calculi on the left. GI/Bowel: No bowel obstruction. The appendix is difficult to visualize of present. Moderate stool in the sigmoid colon. Pelvis: Medina catheter within the bladder. No lymphadenopathy or free fluid. Peritoneum/Retroperitoneum: No lymphadenopathy or ascites. Atherosclerotic disease of the distal abdominal aorta without aneurysm. Bones/Soft Tissues: Multilevel degenerative changes throughout the visualized spine. 1. Stable staghorn-type calculus in the upper pole of the right kidney with additional stones in the lower pole of the right kidney. Mild hydronephrosis appears slightly decreased from the prior CT. 2. Small pleural effusions, right greater than left, with adjacent compressive atelectasis, new from 2018. Ct Head Wo Contrast    Result Date: 5/19/2019  EXAMINATION: CT OF THE HEAD WITHOUT CONTRAST  5/19/2019 2:34 pm TECHNIQUE: CT of the head was performed without the administration of intravenous contrast. Dose modulation, iterative reconstruction, and/or weight based adjustment of the mA/kV was utilized to reduce the radiation dose to as low as reasonably achievable. COMPARISON: 41/13/3488 HISTORY: Metabolic encephalopathy, possibly due to drug abuse. FINDINGS: Image quality degraded by motion artifact. BRAIN/VENTRICLES: No acute intracranial hemorrhage, mass effect or midline shift. No abnormal extra-axial fluid collection. The gray-white differentiation is maintained without evidence of an acute infarct. No hydrocephalus. Scattered hypodensities within the supratentorial white matter likely relate to chronic ischemic changes. Chronic lacunar infarcts of the bilateral basal ganglia. ORBITS: The visualized portion of the orbits demonstrate no acute abnormality. SINUSES: Mild mucosal thickening within the bilateral maxillary sinuses and right sphenoid sinus.   Left maxillary sinus mucous retention cyst. SOFT TISSUES/SKULL:  No acute abnormality of the visualized skull or soft tissues. No acute intracranial abnormality. Mild paranasal sinus disease. Ct Facial Bones Wo Contrast    Result Date: 5/19/2019  EXAMINATION: CT OF THE FACE WITHOUT CONTRAST  5/19/2019 2:41 pm TECHNIQUE: CT of the face was performed without the administration of intravenous contrast. Multiplanar reformatted images are provided for review. Dose modulation, iterative reconstruction, and/or weight based adjustment of the mA/kV was utilized to reduce the radiation dose to as low as reasonably achievable. COMPARISON: CT facial bones 04/17/2019. HISTORY: ORDERING SYSTEM PROVIDED HISTORY: eye swelling TECHNOLOGIST PROVIDED HISTORY: Ordering Physician Provided Reason for Exam: eye swelling Acuity: Acute Type of Exam: Initial Additional signs and symptoms: AMS Relevant Medical/Surgical History: Hx CVA FINDINGS: FACIAL BONES:  The maxilla, pterygoid plates and zygomatic arches are intact. The mandible is intact. The mandibular condyles are normally situated. The nasal bones and maxillary nasal processes are intact. ORBITS:  The globes appear intact. The extraocular muscles, optic nerve sheath complexes and lacrimal glands appear unremarkable. No retrobulbar hematoma or mass is seen. The orbital walls and rims are intact. SINUSES/MASTOIDS:  The paranasal sinuses and mastoid air cells are well aerated. No acute fracture is seen. SOFT TISSUES:  No appreciable facial soft tissue swelling is seen. No acute traumatic injury of the facial bones. No acute orbital abnormality. Xr Chest Portable    Result Date: 5/21/2019  EXAMINATION: ONE XRAY VIEW OF THE CHEST 5/21/2019 11:18 am COMPARISON: 5/18/2019 HISTORY: ORDERING SYSTEM PROVIDED HISTORY: hypoxia TECHNOLOGIST PROVIDED HISTORY: Reason for exam:->hypoxia Ordering Physician Provided Reason for Exam: hypoxia Acuity: Unknown Type of Exam: Unknown Initial exam FINDINGS: Persistent vascular congestion pattern.   Stable cardiomediastinal

## 2019-05-24 LAB
EKG ATRIAL RATE: 60 BPM
EKG DIAGNOSIS: NORMAL
EKG P AXIS: 17 DEGREES
EKG P-R INTERVAL: 220 MS
EKG Q-T INTERVAL: 458 MS
EKG QRS DURATION: 108 MS
EKG QTC CALCULATION (BAZETT): 458 MS
EKG R AXIS: -45 DEGREES
EKG T AXIS: 126 DEGREES
EKG VENTRICULAR RATE: 60 BPM

## 2019-08-22 ENCOUNTER — APPOINTMENT (OUTPATIENT)
Dept: GENERAL RADIOLOGY | Age: 54
DRG: 052 | End: 2019-08-22
Payer: MEDICAID

## 2019-08-22 ENCOUNTER — APPOINTMENT (OUTPATIENT)
Dept: CT IMAGING | Age: 54
DRG: 052 | End: 2019-08-22
Payer: MEDICAID

## 2019-08-22 ENCOUNTER — HOSPITAL ENCOUNTER (INPATIENT)
Age: 54
LOS: 4 days | Discharge: NURSING, REHAB OR LONG-TERM CARE | DRG: 052 | End: 2019-08-26
Attending: FAMILY MEDICINE | Admitting: INTERNAL MEDICINE
Payer: MEDICAID
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
## 2019-08-22 DIAGNOSIS — G93.40 ENCEPHALOPATHY: Primary | ICD-10-CM

## 2019-08-22 DIAGNOSIS — G47.33 OBSTRUCTIVE SLEEP APNEA: ICD-10-CM

## 2019-08-22 DIAGNOSIS — E66.01 MORBID OBESITY WITH BMI OF 50.0-59.9, ADULT (HCC): ICD-10-CM

## 2019-08-22 DIAGNOSIS — R74.8 ELEVATED CK: ICD-10-CM

## 2019-08-22 DIAGNOSIS — R77.8 ELEVATED TROPONIN: ICD-10-CM

## 2019-08-22 PROBLEM — G93.41 ACUTE METABOLIC ENCEPHALOPATHY: Status: ACTIVE | Noted: 2019-08-22

## 2019-08-22 LAB
ABO/RH: NORMAL
ALBUMIN SERPL-MCNC: 3.1 GM/DL (ref 3.4–5)
ALP BLD-CCNC: 146 IU/L (ref 40–129)
ALT SERPL-CCNC: 9 U/L (ref 10–40)
AMMONIA: 52 UMOL/L (ref 16–60)
ANION GAP SERPL CALCULATED.3IONS-SCNC: 11 MMOL/L (ref 4–16)
ANTIBODY SCREEN: NEGATIVE
AST SERPL-CCNC: 13 IU/L (ref 15–37)
BASE EXCESS MIXED: ABNORMAL (ref 0–1.2)
BASOPHILS ABSOLUTE: 0 K/CU MM
BASOPHILS RELATIVE PERCENT: 0.2 % (ref 0–1)
BILIRUB SERPL-MCNC: 0.2 MG/DL (ref 0–1)
BUN BLDV-MCNC: 12 MG/DL (ref 6–23)
CALCIUM SERPL-MCNC: 9.3 MG/DL (ref 8.3–10.6)
CHLORIDE BLD-SCNC: 98 MMOL/L (ref 99–110)
CO2: 28 MMOL/L (ref 21–32)
COMMENT: ABNORMAL
CREAT SERPL-MCNC: 0.9 MG/DL (ref 0.9–1.3)
DIFFERENTIAL TYPE: ABNORMAL
DOSE AMOUNT: NORMAL
DOSE TIME: NORMAL
EOSINOPHILS ABSOLUTE: 0.2 K/CU MM
EOSINOPHILS RELATIVE PERCENT: 1.9 % (ref 0–3)
GFR AFRICAN AMERICAN: >60 ML/MIN/1.73M2
GFR NON-AFRICAN AMERICAN: >60 ML/MIN/1.73M2
GLUCOSE BLD-MCNC: 105 MG/DL (ref 70–99)
GLUCOSE BLD-MCNC: 124 MG/DL (ref 70–99)
GLUCOSE BLD-MCNC: 84 MG/DL (ref 70–99)
GLUCOSE BLD-MCNC: 97 MG/DL (ref 70–99)
GLUCOSE BLD-MCNC: 98 MG/DL (ref 70–99)
HCO3 VENOUS: 28.2 MMOL/L (ref 19–25)
HCT VFR BLD CALC: 35.9 % (ref 42–52)
HEMOGLOBIN: 11 GM/DL (ref 13.5–18)
IMMATURE NEUTROPHIL %: 0.4 % (ref 0–0.43)
LACTATE: 0.7 MMOL/L (ref 0.4–2)
LV EF: 48 %
LVEF MODALITY: NORMAL
LYMPHOCYTES ABSOLUTE: 1.8 K/CU MM
LYMPHOCYTES RELATIVE PERCENT: 18.7 % (ref 24–44)
MCH RBC QN AUTO: 25.8 PG (ref 27–31)
MCHC RBC AUTO-ENTMCNC: 30.6 % (ref 32–36)
MCV RBC AUTO: 84.3 FL (ref 78–100)
MONOCYTES ABSOLUTE: 1.1 K/CU MM
MONOCYTES RELATIVE PERCENT: 11.1 % (ref 0–4)
NUCLEATED RBC %: 0 %
O2 SAT, VEN: 89.2 % (ref 50–70)
PCO2, VEN: 33 MMHG (ref 38–52)
PDW BLD-RTO: 16.9 % (ref 11.7–14.9)
PH VENOUS: 7.54 (ref 7.32–7.42)
PHENYTOIN LEVEL: 13.5 UG/ML (ref 10–20)
PLATELET # BLD: 232 K/CU MM (ref 140–440)
PMV BLD AUTO: 8.3 FL (ref 7.5–11.1)
PO2, VEN: 58 MMHG (ref 28–48)
POTASSIUM SERPL-SCNC: 3.7 MMOL/L (ref 3.5–5.1)
RBC # BLD: 4.26 M/CU MM (ref 4.6–6.2)
SEGMENTED NEUTROPHILS ABSOLUTE COUNT: 6.6 K/CU MM
SEGMENTED NEUTROPHILS RELATIVE PERCENT: 67.7 % (ref 36–66)
SODIUM BLD-SCNC: 137 MMOL/L (ref 135–145)
TOTAL CK: 268 IU/L (ref 38–174)
TOTAL IMMATURE NEUTOROPHIL: 0.04 K/CU MM
TOTAL NUCLEATED RBC: 0 K/CU MM
TOTAL PROTEIN: 7.6 GM/DL (ref 6.4–8.2)
TROPONIN T: 0.03 NG/ML
TROPONIN T: 0.04 NG/ML
TROPONIN T: 0.05 NG/ML
WBC # BLD: 9.8 K/CU MM (ref 4–10.5)

## 2019-08-22 PROCEDURE — C8929 TTE W OR WO FOL WCON,DOPPLER: HCPCS

## 2019-08-22 PROCEDURE — 72125 CT NECK SPINE W/O DYE: CPT

## 2019-08-22 PROCEDURE — 70450 CT HEAD/BRAIN W/O DYE: CPT

## 2019-08-22 PROCEDURE — 36415 COLL VENOUS BLD VENIPUNCTURE: CPT

## 2019-08-22 PROCEDURE — 93005 ELECTROCARDIOGRAM TRACING: CPT | Performed by: FAMILY MEDICINE

## 2019-08-22 PROCEDURE — 85025 COMPLETE CBC W/AUTO DIFF WBC: CPT

## 2019-08-22 PROCEDURE — 6370000000 HC RX 637 (ALT 250 FOR IP): Performed by: INTERNAL MEDICINE

## 2019-08-22 PROCEDURE — APPNB60 APP NON BILLABLE TIME 46-60 MINS: Performed by: NURSE PRACTITIONER

## 2019-08-22 PROCEDURE — 86900 BLOOD TYPING SEROLOGIC ABO: CPT

## 2019-08-22 PROCEDURE — 84484 ASSAY OF TROPONIN QUANT: CPT

## 2019-08-22 PROCEDURE — 93306 TTE W/DOPPLER COMPLETE: CPT

## 2019-08-22 PROCEDURE — 82140 ASSAY OF AMMONIA: CPT

## 2019-08-22 PROCEDURE — 93010 ELECTROCARDIOGRAM REPORT: CPT | Performed by: INTERNAL MEDICINE

## 2019-08-22 PROCEDURE — 86901 BLOOD TYPING SEROLOGIC RH(D): CPT

## 2019-08-22 PROCEDURE — 99285 EMERGENCY DEPT VISIT HI MDM: CPT

## 2019-08-22 PROCEDURE — 74176 CT ABD & PELVIS W/O CONTRAST: CPT

## 2019-08-22 PROCEDURE — 82550 ASSAY OF CK (CPK): CPT

## 2019-08-22 PROCEDURE — 80185 ASSAY OF PHENYTOIN TOTAL: CPT

## 2019-08-22 PROCEDURE — 80053 COMPREHEN METABOLIC PANEL: CPT

## 2019-08-22 PROCEDURE — 83605 ASSAY OF LACTIC ACID: CPT

## 2019-08-22 PROCEDURE — 6360000002 HC RX W HCPCS: Performed by: INTERNAL MEDICINE

## 2019-08-22 PROCEDURE — 94761 N-INVAS EAR/PLS OXIMETRY MLT: CPT

## 2019-08-22 PROCEDURE — 94660 CPAP INITIATION&MGMT: CPT

## 2019-08-22 PROCEDURE — 82962 GLUCOSE BLOOD TEST: CPT

## 2019-08-22 PROCEDURE — 6370000000 HC RX 637 (ALT 250 FOR IP): Performed by: FAMILY MEDICINE

## 2019-08-22 PROCEDURE — 6360000004 HC RX CONTRAST MEDICATION: Performed by: INTERNAL MEDICINE

## 2019-08-22 PROCEDURE — 87040 BLOOD CULTURE FOR BACTERIA: CPT

## 2019-08-22 PROCEDURE — 2580000003 HC RX 258: Performed by: INTERNAL MEDICINE

## 2019-08-22 PROCEDURE — 80186 ASSAY OF PHENYTOIN FREE: CPT

## 2019-08-22 PROCEDURE — 82805 BLOOD GASES W/O2 SATURATION: CPT

## 2019-08-22 PROCEDURE — 96374 THER/PROPH/DIAG INJ IV PUSH: CPT

## 2019-08-22 PROCEDURE — 2580000003 HC RX 258: Performed by: FAMILY MEDICINE

## 2019-08-22 PROCEDURE — 6360000002 HC RX W HCPCS: Performed by: FAMILY MEDICINE

## 2019-08-22 PROCEDURE — 92610 EVALUATE SWALLOWING FUNCTION: CPT

## 2019-08-22 PROCEDURE — 2060000000 HC ICU INTERMEDIATE R&B

## 2019-08-22 PROCEDURE — 99253 IP/OBS CNSLTJ NEW/EST LOW 45: CPT | Performed by: INTERNAL MEDICINE

## 2019-08-22 PROCEDURE — 2700000000 HC OXYGEN THERAPY PER DAY

## 2019-08-22 PROCEDURE — 71250 CT THORAX DX C-: CPT

## 2019-08-22 PROCEDURE — 86850 RBC ANTIBODY SCREEN: CPT

## 2019-08-22 RX ORDER — METOPROLOL SUCCINATE 25 MG/1
25 TABLET, EXTENDED RELEASE ORAL DAILY
Status: DISCONTINUED | OUTPATIENT
Start: 2019-08-22 | End: 2019-08-26 | Stop reason: HOSPADM

## 2019-08-22 RX ORDER — ACETAMINOPHEN 325 MG/1
650 TABLET ORAL EVERY 4 HOURS PRN
Status: DISCONTINUED | OUTPATIENT
Start: 2019-08-22 | End: 2019-08-26 | Stop reason: HOSPADM

## 2019-08-22 RX ORDER — LISINOPRIL 10 MG/1
10 TABLET ORAL DAILY
Status: DISCONTINUED | OUTPATIENT
Start: 2019-08-22 | End: 2019-08-26 | Stop reason: HOSPADM

## 2019-08-22 RX ORDER — CLOPIDOGREL BISULFATE 75 MG/1
75 TABLET ORAL DAILY
Status: DISCONTINUED | OUTPATIENT
Start: 2019-08-22 | End: 2019-08-26 | Stop reason: HOSPADM

## 2019-08-22 RX ORDER — NICOTINE POLACRILEX 4 MG
15 LOZENGE BUCCAL PRN
Status: DISCONTINUED | OUTPATIENT
Start: 2019-08-22 | End: 2019-08-26 | Stop reason: HOSPADM

## 2019-08-22 RX ORDER — TAMSULOSIN HYDROCHLORIDE 0.4 MG/1
0.4 CAPSULE ORAL DAILY
Status: DISCONTINUED | OUTPATIENT
Start: 2019-08-22 | End: 2019-08-26 | Stop reason: HOSPADM

## 2019-08-22 RX ORDER — FUROSEMIDE 40 MG/1
40 TABLET ORAL DAILY
Status: DISCONTINUED | OUTPATIENT
Start: 2019-08-23 | End: 2019-08-26 | Stop reason: HOSPADM

## 2019-08-22 RX ORDER — FENTANYL CITRATE 50 UG/ML
25 INJECTION, SOLUTION INTRAMUSCULAR; INTRAVENOUS ONCE
Status: COMPLETED | OUTPATIENT
Start: 2019-08-22 | End: 2019-08-22

## 2019-08-22 RX ORDER — SODIUM CHLORIDE 9 MG/ML
INJECTION, SOLUTION INTRAVENOUS CONTINUOUS
Status: DISCONTINUED | OUTPATIENT
Start: 2019-08-22 | End: 2019-08-22

## 2019-08-22 RX ORDER — FAMOTIDINE 20 MG/1
20 TABLET, FILM COATED ORAL 2 TIMES DAILY
Status: DISCONTINUED | OUTPATIENT
Start: 2019-08-22 | End: 2019-08-26 | Stop reason: HOSPADM

## 2019-08-22 RX ORDER — DOCUSATE SODIUM 100 MG/1
100 CAPSULE, LIQUID FILLED ORAL 2 TIMES DAILY
Status: DISCONTINUED | OUTPATIENT
Start: 2019-08-22 | End: 2019-08-26 | Stop reason: HOSPADM

## 2019-08-22 RX ORDER — ASPIRIN 81 MG/1
324 TABLET, CHEWABLE ORAL ONCE
Status: COMPLETED | OUTPATIENT
Start: 2019-08-22 | End: 2019-08-22

## 2019-08-22 RX ORDER — DEXTROSE MONOHYDRATE 50 MG/ML
100 INJECTION, SOLUTION INTRAVENOUS PRN
Status: DISCONTINUED | OUTPATIENT
Start: 2019-08-22 | End: 2019-08-26 | Stop reason: HOSPADM

## 2019-08-22 RX ORDER — DEXTROSE MONOHYDRATE 25 G/50ML
12.5 INJECTION, SOLUTION INTRAVENOUS PRN
Status: DISCONTINUED | OUTPATIENT
Start: 2019-08-22 | End: 2019-08-26 | Stop reason: HOSPADM

## 2019-08-22 RX ORDER — GABAPENTIN 100 MG/1
100 CAPSULE ORAL 3 TIMES DAILY
Status: DISCONTINUED | OUTPATIENT
Start: 2019-08-22 | End: 2019-08-26 | Stop reason: HOSPADM

## 2019-08-22 RX ADMIN — Medication 400 MG: at 20:56

## 2019-08-22 RX ADMIN — ENOXAPARIN SODIUM 160 MG: 100 INJECTION SUBCUTANEOUS at 11:06

## 2019-08-22 RX ADMIN — Medication 400 MG: at 11:07

## 2019-08-22 RX ADMIN — GABAPENTIN 100 MG: 100 CAPSULE ORAL at 11:08

## 2019-08-22 RX ADMIN — DOCUSATE SODIUM 100 MG: 100 CAPSULE, LIQUID FILLED ORAL at 20:56

## 2019-08-22 RX ADMIN — SODIUM CHLORIDE: 9 INJECTION, SOLUTION INTRAVENOUS at 06:11

## 2019-08-22 RX ADMIN — TAMSULOSIN HYDROCHLORIDE 0.4 MG: 0.4 CAPSULE ORAL at 11:08

## 2019-08-22 RX ADMIN — LISINOPRIL 10 MG: 10 TABLET ORAL at 11:08

## 2019-08-22 RX ADMIN — FAMOTIDINE 20 MG: 20 TABLET ORAL at 20:56

## 2019-08-22 RX ADMIN — CLOPIDOGREL BISULFATE 75 MG: 75 TABLET ORAL at 11:07

## 2019-08-22 RX ADMIN — DOCUSATE SODIUM 100 MG: 100 CAPSULE, LIQUID FILLED ORAL at 11:08

## 2019-08-22 RX ADMIN — FENTANYL CITRATE 25 MCG: 50 INJECTION, SOLUTION INTRAMUSCULAR; INTRAVENOUS at 04:50

## 2019-08-22 RX ADMIN — METOPROLOL SUCCINATE 25 MG: 25 TABLET, EXTENDED RELEASE ORAL at 11:08

## 2019-08-22 RX ADMIN — FAMOTIDINE 20 MG: 20 TABLET ORAL at 11:08

## 2019-08-22 RX ADMIN — PERFLUTREN 1.2 MG: 6.52 INJECTION, SUSPENSION INTRAVENOUS at 10:03

## 2019-08-22 RX ADMIN — ASPIRIN 81 MG 324 MG: 81 TABLET ORAL at 06:11

## 2019-08-22 RX ADMIN — DEXTROSE MONOHYDRATE 200 MG PE: 50 INJECTION, SOLUTION INTRAVENOUS at 18:45

## 2019-08-22 RX ADMIN — GABAPENTIN 100 MG: 100 CAPSULE ORAL at 14:33

## 2019-08-22 RX ADMIN — SODIUM CHLORIDE: 9 INJECTION, SOLUTION INTRAVENOUS at 11:03

## 2019-08-22 RX ADMIN — DEXTROSE MONOHYDRATE 200 MG PE: 50 INJECTION, SOLUTION INTRAVENOUS at 11:01

## 2019-08-22 ASSESSMENT — PAIN SCALES - GENERAL
PAINLEVEL_OUTOF10: 8
PAINLEVEL_OUTOF10: 0
PAINLEVEL_OUTOF10: 0

## 2019-08-22 NOTE — ED NOTES
Bed: ED-27  Expected date:   Expected time:   Means of arrival:   Comments:  EMS        Sigrid Wahl RN  08/22/19 8839

## 2019-08-22 NOTE — PROGRESS NOTES
Pt woke up to take his medications. Pt stated he was tired. Pt is able to swallow pills whole with a drink of water. No coughing or choking noted.

## 2019-08-22 NOTE — CARE COORDINATION
Per chart review , patient is long term care at [facility] . Patient has PCP and Medicaid with RX coverage. Spoke with Jacques Avery in  Admissions from [facility] . She confirmed patient is LTC and can return when medically stable . No precert needed.

## 2019-08-22 NOTE — ED PROVIDER NOTES
Triage Chief Complaint:   Fall    [place]:  Samuel Baldwin is a 48 y.o. male that presents from nursing home with a report of having fallen out of his wheelchair 2 days ago with complaint of right shoulder pain and right hip pain. Patient is morbidly obese and has multiple comorbidities. He presents obtunded but protecting his airway with essentially normal vital signs. With sternal rub or loud prompting he will answer questions but then fall back asleep. He specifically denies chest pain. He does note right shoulder and right hip pain. He denies headache or loss of consciousness when he fell. He denies recent fevers cough or shortness of breath over baseline and includes wearing his CPAP at nighttime    Remainder of review of systems unable to be obtained      Past Medical History:   Diagnosis Date    CAD (coronary artery disease) 3/19/2014    Diabetes mellitus ([facility] [address]     New diagnosis 3/19/14    Family history of early CAD     Father-age 42's    H/O cardiovascular stress test 5/9/2014    EF 62% normal study    H/O echocardiogram 7/28/15    EF 55-60%. Mild mitral and tricuspid insuff.  H/O echocardiogram 05/09/2018    EF40-50% mild phtn, , TR    History of cardiac catheterization 3/19/2014    3/2014-LAD 99% prox,LAD 80% mid,CX dominant with OM1 50% prox, PDA 80%, RCA 80% mid-PTCA with 1 stent to LAD prox and 2 stents to LAD mid. CX stent in 2nd sitting as OP-[facility]    History of echocardiogram 3/19/2014    3/2014-Mild LVH. Normal global and regional LVSF. EF 60%.  Mild MR/TR;    Hx of Doppler ultrasound 5/09/14    Peripheral- Normal    Hyperlipemia     Loss of consciousness ([address] 3/18/2014    Multiple lacunar infarcts (HCC)     chronic-basal ganglia region bilaterally    NSTEMI (non-ST elevated myocardial infarction) ([address] 3/19/2014    S/P primary angioplasty with coronary stent 3/19/2014    3/2014-PTCA with 3 stents to LAD;    Seizures [facility])      Past Surgical History:   Procedure Laterality Not on file     Physically abused: Not on file     Forced sexual activity: Not on file   Other Topics Concern    Not on file   Social History Narrative    Not on file     Current Facility-Administered Medications   Medication Dose Route Frequency Provider Last Rate Last Dose    0.9 % sodium chloride infusion   Intravenous Continuous Yari Devlin  mL/hr at 08/22/19 0611       Current Outpatient Medications   Medication Sig Dispense Refill    furosemide (LASIX) 40 MG tablet Take 1 tablet by mouth daily 60 tablet 3    magnesium oxide (MAG-OX) 400 MG tablet Take 1 tablet by mouth 2 times daily 30 tablet 1    potassium chloride (KLOR-CON M) 10 MEQ extended release tablet Take 1 tablet by mouth 2 times daily 180 tablet 1    gabapentin (NEURONTIN) 100 MG capsule Take 100 mg by mouth 3 times daily. .      LORazepam (ATIVAN) 2 MG/ML injection Inject 1 mg into the muscle every 4 hours. Henny Archer insulin lispro (HUMALOG) 100 UNIT/ML injection vial Inject into the skin 2 times daily (before meals)      b complex vitamins capsule Take 1 capsule by mouth daily      phenytoin (DILANTIN) 100 MG ER capsule Take 300 mg by mouth 2 times daily      clopidogrel (PLAVIX) 75 MG tablet Take 75 mg by mouth daily      tamsulosin (FLOMAX) 0.4 MG capsule Take 1 capsule by mouth daily 30 capsule 3    aspirin 81 MG chewable tablet Take 4 tablets by mouth daily 30 tablet 3    insulin aspart (NOVOLOG) 100 UNIT/ML injection vial Inject into the skin 3 times daily (before meals)      oxybutynin (DITROPAN) 5 MG tablet Take 5 mg by mouth 3 times daily      metoprolol succinate (TOPROL XL) 25 MG extended release tablet Take 25 mg by mouth daily      acetaminophen (TYLENOL) 325 MG tablet Take 2 tablets by mouth every 4 hours as needed for Pain (Patient taking differently: Take 500 mg by mouth 3 times daily as needed for Pain ) 120 tablet 3    FLUoxetine (PROZAC) 20 MG capsule Take 1 capsule by mouth daily 30 capsule 3    insulin glargine (LANTUS) 100 UNIT/ML injection vial Inject 10 Units into the skin nightly 1 vial 3    folic acid-pyridoxine-cyancobalamin (FOLTX) 1.13-25-2 MG TABS Take 1 tablet by mouth daily 30 tablet     docusate (COLACE, DULCOLAX) 100 MG CAPS Take 100 mg by mouth 2 times daily      ranitidine (ZANTAC) 150 MG capsule Take 150 mg by mouth 2 times daily      cyclobenzaprine (FLEXERIL) 10 MG tablet Take 10 mg by mouth 3 times daily as needed for Muscle spasms      lisinopril (PRINIVIL;ZESTRIL) 10 MG tablet Take 10 mg by mouth daily      metFORMIN (GLUCOPHAGE) 1000 MG tablet TAKE 1 TABLET BY MOUTH 2 TIMES DAILY (WITH MEALS) 60 tablet 3    atorvastatin (LIPITOR) 80 MG tablet Take 1 tablet by mouth nightly 30 tablet 6    nitroGLYCERIN (NITROSTAT) 0.4 MG SL tablet Place 1 tablet under the tongue every 5 minutes as needed for Chest pain. 25 tablet 0     Allergies   Allergen Reactions    Latex        Nursing Notes Reviewed    Physical Exam:  ED Triage Vitals [08/22/19 0401]   Enc Vitals Group      BP (!) 149/74      Pulse 88      Resp 19      Temp 98.7 °F (37.1 °C)      Temp Source Oral      SpO2 98 %      Weight (!) 360 lb (163.3 kg)      Height 5' 6\" (1.676 m)      Head Circumference       Peak Flow       Pain Score       Pain Loc       Pain Edu? Excl. in [address]? My pulse ox interpretation is - normal    General appearance: Medically ill, BMI of 58.2, obvious sleep apnea and snoring  Skin:  Warm. Dry. No petechiae or purpura. Eye:  Extraocular movements intact. PERRLA  Ears, nose, mouth and throat: Dry mucous membranes tympanic membranes bilaterally normal.  Oropharynx with no exudate or erythema. Neck:  Trachea midline. Supple. No cervical lymphadenopathy  Extremity:  No swelling.   Will move upper extremities and lower extremities with mildly prompted, does not appear have any unilateral or asymmetric findings, has no deformity, no bruising petechiae purpura or abrasions  Heart:  Regular rate artifact. No obstructive uropathy. Redemonstration of multiple nonobstructing right kidney stones including a large staghorn type calculus. No obvious acute fracture. Ct Head Wo Contrast    Result Date: 8/22/2019  EXAMINATION: CT OF THE HEAD WITHOUT CONTRAST  8/22/2019 5:22 am TECHNIQUE: CT of the head was performed without the administration of intravenous contrast. Dose modulation, iterative reconstruction, and/or weight based adjustment of the mA/kV was utilized to reduce the radiation dose to as low as reasonably achievable. COMPARISON: 05/19/2019 HISTORY: ORDERING SYSTEM PROVIDED HISTORY: somulent TECHNOLOGIST PROVIDED HISTORY: Has a \"code stroke\" or \"stroke alert\" been called? ->No Reason for Exam: fall FINDINGS: BRAIN/VENTRICLES: Evaluation of the posterior and middle cranial fossae is limited secondary to beam hardening artifact from the neck and shoulders. There is no acute intracranial hemorrhage, mass effect or midline shift. No abnormal extra-axial fluid collection. Unchanged slightly asymmetric left periventricular hypoattenuation and probable old bilateral basal ganglia lacunar infarcts. No evidence of recent territorial infarct. There are areas of hypoattenuation in the periventricular white matter and centrum semiovale that are likely related to chronic small vessel ischemic disease. There is no evidence of hydrocephalus. There is intracranial atherosclerosis. ORBITS: The visualized portion of the orbits demonstrate no acute abnormality. SINUSES: The visualized paranasal sinuses and mastoid air cells demonstrate no acute abnormality. SOFT TISSUES/SKULL:  No acute abnormality of the visualized skull or soft tissues. Limited evaluation with no evidence of acute intracranial abnormality. Ct Chest Wo Contrast    1. Motion limited evaluation. Right infrahilar airspace opacities may reflect pneumonia or atelectasis.  2. No acute bony abnormality of the right shoulder or imaged osseous as well as words and phrases that may be inappropriate. If there are any questions or concerns please feel free to contact the dictating provider for clarification.       Krzysztof Rico MD  08/22/19 2626

## 2019-08-22 NOTE — PROGRESS NOTES
Speech Language Pathology  Facility/Department: [facility] STEPDOWN   CLINICAL BEDSIDE SWALLOW EVALUATION    NAME: Celio Stevenson  : 1965  MRN: [id number]    IMPRESSIONS: Celio Stevenson was referred for a bedside swallow evaluation after being admitted to [facility] with acute encephalopathy. Medical hx includes seizures, CAD, DM, HTN, NSTEMI. No known history of dysphagia prior to admission. Pt was seen for evaluation seated upright in bed, lethargic, following directions inconsistently given cues/models. Oral mechanism examination reveals overall reduced coordination without asymmetry. Pt was presented with PO trials of thin liquids via tsp/straw, puree, soft solids, and regular solids. Oral stage is mildly impaired, with prolonged/reduced mastication, slow AP transit, and adequate oral clearance. Pharyngeal stage appears [facility] with intact swallow initiation and laryngeal elevation. No s/s aspiration across all trials. Clinician fed throughout as pt was unable to follow directions to feed himself. Recommend initiation of thin liquid/dysphagia III (soft/bite-sized solid) diet. Pt is a total feed and should be positioned upright for all PO. SLP will follow. ADMISSION DATE: 2019  ADMITTING DIAGNOSIS: has Seizure [facility]); NSTEMI (non-ST elevated myocardial infarction) ([facility] [address] Claudication [facility]); S/P primary angioplasty with coronary stent; Hyperlipemia; Coronary arteriosclerosis in native artery; Diabetes mellitus type 2, uncontrolled, without complications ([address] Tobacco use disorder; Low back pain with sciatica; Left knee pain; Mixed hyperlipidemia; Hypertensive disorder; Renal stone; Poor hygiene; Angina pectoris, unstable ([address] Primary osteoarthritis of left knee; Primary osteoarthritis of right knee; Numbness; Cerebrovascular accident (CVA) ([address] Weak; UTI (urinary tract infection) with pyuria; Fall at nursing home; Elevated troponin level; Diabetes mellitus ([address]  Class 3 obesity with body mass index (BMI) of 45.0 to 49.9 in adult; Staghorn renal calculus; Staghorn calculus; GERD (gastroesophageal reflux disease); Cerebral infarction ([address] Epileptic seizure ([address] Functional diarrhea; Gastroesophageal reflux disease without esophagitis; History of percutaneous coronary intervention; Muscle weakness; Secondary diabetes mellitus ([address] Metabolic encephalopathy; and Acute metabolic encephalopathy on their problem list.  ONSET DATE: this admission    Recent Chest Xray/CT of Chest: see chart    Date of Eval: 8/22/2019  Evaluating Therapist: Soraya Walker    Current Diet level:  Current Diet : NPO  Current Liquid Diet : NPO      Primary Complaint  Patient Complaint: does not state    Pain:  Pain Assessment  Pain Assessment: 0-10  Pain Level: 0    Reason for Referral  Debalin Mention was referred for a bedside swallow evaluation to assess the efficiency of his swallow function, identify signs and symptoms of aspiration and make recommendations regarding safe dietary consistencies, effective compensatory strategies, and safe eating environment. Impression  Dysphagia Diagnosis: Mild oral stage dysphagia  Dysphagia Outcome Severity Scale: Level 5: Mild dysphagia- Distant supervision. May need one diet consistency restricted     Treatment Plan  Requires SLP Intervention: Yes  Duration/Frequency of Treatment: monitor 1-2x  D/C Recommendations: 24 hour supervision/assistance; To be determined       Recommended Diet and Intervention  Diet Solids Recommendation: Dysphagia Soft and Bite-Sized (Dysphagia III)  Liquid Consistency Recommendation: Thin  Recommended Form of Meds: PO  Recommendations: Total feed  Therapeutic Interventions: Diet tolerance monitoring;Patient/Family education; Therapeutic PO trials with SLP    Compensatory Swallowing Strategies  Compensatory Swallowing Strategies: Upright as possible for all oral intake; Total feed;Small bites/sips    Treatment/Goals  Short-term Goals  Timeframe for Short-term Goals: length of admission  Goal 1: Pt will tolerate thin liquid/dysphagia III diet with adequate oral manipulation and no s/s aspiration. Goal 2: Pt will tolerate trials of advanced textures with adequate oral manipulation for possible diet advancement. Goal 3: Pt/caregivers will indicate understanding of all recommendations. General  Chart Reviewed: Yes  Behavior/Cognition: Lethargic;Cooperative; Requires cueing  Respiratory Status: O2 via nasual cannula  O2 Device: Nasal cannula  Communication Observation: Non-verbal(limited unintelligible verbalizations)  Follows Directions: Simple  Dentition: Adequate  Patient Positioning: Upright in bed  Prior Dysphagia History: none known prior to admission  Consistencies Administered: Reg solid; Dysphagia Minced and Moist (Dysphagia II); Thin - teaspoon; Thin - straw;Dysphagia Pureed (Dysphagia I)           Vision/Hearing  Vision  Vision: Within Functional Limits  Hearing  Hearing: Within functional limits    Oral Motor Deficits  Oral/Motor  Oral Motor: Exceptions to [facility]    Oral Phase Dysfunction  Oral Phase  Oral Phase: Exceptions     Indicators of Pharyngeal Phase Dysfunction   Pharyngeal Phase  Pharyngeal Phase: WFL    Prognosis  Prognosis  Prognosis for safe diet advancement: excellent  Individuals consulted  Consulted and agree with results and recommendations: Patient    Education  Patient Education: results, recommendations  Patient Education Response: Needs reinforcement  Safety Devices in place: Yes  Type of devices:  All fall risk precautions in place           Therapy Time  SLP Individual Minutes  Time In: 6502  Time Out: 700 [address]  Minutes: 600 [facility], [address] [address]  8/22/2019 4:06 PM

## 2019-08-22 NOTE — CONSULTS
abdominal organs and vasculature due to lack of intravenous contrast. Partial exclusion of the left lateral abdomen. Image quality is degraded by motion and noise artifact. Lower Chest: Please refer to concurrent chest CT report for intrathoracic findings. Liver: Normal. Gallbladder and Bile Ducts: Normal. Spleen: Normal. Adrenal Glands: Normal. Pancreas: Normal. Genitourinary: Redemonstration of large staghorn type calculus in the right kidney with multiple additional nonobstructing stones in the right kidney. Normal left kidney. No obstructive uropathy. Both ureters are normal in course and caliber. The urinary bladder is unremarkable. Normal prostate gland and seminal vesicles. . Bowel: Normal. Vasculature: Atherosclerosis. No abdominal aortic aneurysm. Bones and Soft Tissues: Degenerative osseous changes in the visualized spine and pelvis. No obvious acute fracture. Retroperitoneum/Mesentery: No intraperitoneal free air, ascites or fluid collection. No lymphadenopathy in the abdomen or pelvis. No CT evidence of acute injury in the abdomen or pelvis given motion and noise artifact. No obstructive uropathy. Redemonstration of multiple nonobstructing right kidney stones including a large staghorn type calculus. No obvious acute fracture. Ct Head Wo Contrast    Result Date: 8/22/2019  EXAMINATION: CT OF THE HEAD WITHOUT CONTRAST  8/22/2019 5:22 am TECHNIQUE: CT of the head was performed without the administration of intravenous contrast. Dose modulation, iterative reconstruction, and/or weight based adjustment of the mA/kV was utilized to reduce the radiation dose to as low as reasonably achievable. COMPARISON: 05/19/2019 HISTORY: ORDERING SYSTEM PROVIDED HISTORY: somulent TECHNOLOGIST PROVIDED HISTORY: Has a \"code stroke\" or \"stroke alert\" been called? ->No Reason for Exam: fall FINDINGS: BRAIN/VENTRICLES: Evaluation of the posterior and middle cranial fossae is limited secondary to beam hardening artifact from the neck and shoulders. There is no acute intracranial hemorrhage, mass effect or midline shift. No abnormal extra-axial fluid collection. Unchanged slightly asymmetric left periventricular hypoattenuation and probable old bilateral basal ganglia lacunar infarcts. No evidence of recent territorial infarct. There are areas of hypoattenuation in the periventricular white matter and centrum semiovale that are likely related to chronic small vessel ischemic disease. There is no evidence of hydrocephalus. There is intracranial atherosclerosis. ORBITS: The visualized portion of the orbits demonstrate no acute abnormality. SINUSES: The visualized paranasal sinuses and mastoid air cells demonstrate no acute abnormality. SOFT TISSUES/SKULL:  No acute abnormality of the visualized skull or soft tissues. Limited evaluation with no evidence of acute intracranial abnormality. Ct Chest Wo Contrast    Result Date: 8/22/2019  EXAMINATION: CT OF THE CHEST WITHOUT CONTRAST 8/22/2019 5:21 am TECHNIQUE: CT of the chest was performed without the administration of intravenous contrast. Multiplanar reformatted images are provided for review. Dose modulation, iterative reconstruction, and/or weight based adjustment of the mA/kV was utilized to reduce the radiation dose to as low as reasonably achievable. COMPARISON: None. HISTORY: ORDERING SYSTEM PROVIDED HISTORY: fall, shoulder pain on right, obese, somulent FINDINGS: Mediastinum: There is no evidence of mediastinal adenopathy. Coronary artery disease is noted. The main pulmonary artery is dilated to 3.6 cm, which can be seen in the setting of pulmonary hypertension. Lungs/pleura: Right infrahilar airspace opacities may be related to atelectasis or pneumonia, however motion limits evaluation. No effusion or pneumothorax. Upper Abdomen: Please see separately dictated CT of the abdomen and pelvis for findings below the diaphragm.  Soft Tissues/Bones: No acute

## 2019-08-22 NOTE — H&P
Concern    None   Social History Narrative    None       MEDICATIONS   Medications Prior to Admission  Not in a hospital admission. Current Medications  Current Facility-Administered Medications   Medication Dose Route Frequency Provider Last Rate Last Dose    0.9 % sodium chloride infusion   Intravenous Continuous Denise Martinez  mL/hr at 08/22/19 0611       Current Outpatient Medications   Medication Sig Dispense Refill    furosemide (LASIX) 40 MG tablet Take 1 tablet by mouth daily 60 tablet 3    magnesium oxide (MAG-OX) 400 MG tablet Take 1 tablet by mouth 2 times daily 30 tablet 1    potassium chloride (KLOR-CON M) 10 MEQ extended release tablet Take 1 tablet by mouth 2 times daily 180 tablet 1    gabapentin (NEURONTIN) 100 MG capsule Take 100 mg by mouth 3 times daily. .      LORazepam (ATIVAN) 2 MG/ML injection Inject 1 mg into the muscle every 4 hours. Tresia Lashell insulin lispro (HUMALOG) 100 UNIT/ML injection vial Inject into the skin 2 times daily (before meals)      b complex vitamins capsule Take 1 capsule by mouth daily      phenytoin (DILANTIN) 100 MG ER capsule Take 300 mg by mouth 2 times daily      clopidogrel (PLAVIX) 75 MG tablet Take 75 mg by mouth daily      tamsulosin (FLOMAX) 0.4 MG capsule Take 1 capsule by mouth daily 30 capsule 3    aspirin 81 MG chewable tablet Take 4 tablets by mouth daily 30 tablet 3    insulin aspart (NOVOLOG) 100 UNIT/ML injection vial Inject into the skin 3 times daily (before meals)      oxybutynin (DITROPAN) 5 MG tablet Take 5 mg by mouth 3 times daily      metoprolol succinate (TOPROL XL) 25 MG extended release tablet Take 25 mg by mouth daily      acetaminophen (TYLENOL) 325 MG tablet Take 2 tablets by mouth every 4 hours as needed for Pain (Patient taking differently: Take 500 mg by mouth 3 times daily as needed for Pain ) 120 tablet 3    FLUoxetine (PROZAC) 20 MG capsule Take 1 capsule by mouth daily 30 capsule 3    insulin glargine

## 2019-08-22 NOTE — CONSULTS
FINDINGS: BRAIN/VENTRICLES: Evaluation of the posterior and middle cranial fossae is limited secondary to beam hardening artifact from the neck and shoulders. There is no acute intracranial hemorrhage, mass effect or midline shift. No abnormal extra-axial fluid collection. Unchanged slightly asymmetric left periventricular hypoattenuation and probable old bilateral basal ganglia lacunar infarcts. No evidence of recent territorial infarct. There are areas of hypoattenuation in the periventricular white matter and centrum semiovale that are likely related to chronic small vessel ischemic disease. There is no evidence of hydrocephalus. There is intracranial atherosclerosis. ORBITS: The visualized portion of the orbits demonstrate no acute abnormality. SINUSES: The visualized paranasal sinuses and mastoid air cells demonstrate no acute abnormality. SOFT TISSUES/SKULL:  No acute abnormality of the visualized skull or soft tissues. Limited evaluation with no evidence of acute intracranial abnormality. Ct Chest Wo Contrast    Result Date: 8/22/2019  EXAMINATION: CT OF THE CHEST WITHOUT CONTRAST 8/22/2019 5:21 am TECHNIQUE: CT of the chest was performed without the administration of intravenous contrast. Multiplanar reformatted images are provided for review. Dose modulation, iterative reconstruction, and/or weight based adjustment of the mA/kV was utilized to reduce the radiation dose to as low as reasonably achievable. COMPARISON: None. HISTORY: ORDERING SYSTEM PROVIDED HISTORY: fall, shoulder pain on right, obese, somulent FINDINGS: Mediastinum: There is no evidence of mediastinal adenopathy. Coronary artery disease is noted. The main pulmonary artery is dilated to 3.6 cm, which can be seen in the setting of pulmonary hypertension. Lungs/pleura: Right infrahilar airspace opacities may be related to atelectasis or pneumonia, however motion limits evaluation. No effusion or pneumothorax.  Upper Abdomen:

## 2019-08-22 NOTE — PROGRESS NOTES
Pt admitted from the ED. Pt wearing gown from [facility]. Pt is alert but falls asleep quickly. Two person skin assessment done with Celso Conrad RN. Pt has redness to buttocks and groin. Dried stool cleaned from pt's buttock. Pt oriented to room, call light in reach, table at bedside. Pt placed on a continuous bipap. Will closely monitor.

## 2019-08-23 ENCOUNTER — APPOINTMENT (OUTPATIENT)
Dept: MRI IMAGING | Age: 54
DRG: 052 | End: 2019-08-23
Payer: MEDICAID

## 2019-08-23 ENCOUNTER — APPOINTMENT (OUTPATIENT)
Dept: ULTRASOUND IMAGING | Age: 54
DRG: 052 | End: 2019-08-23
Payer: MEDICAID

## 2019-08-23 LAB
ANION GAP SERPL CALCULATED.3IONS-SCNC: 9 MMOL/L (ref 4–16)
BASOPHILS ABSOLUTE: 0 K/CU MM
BASOPHILS RELATIVE PERCENT: 0.2 % (ref 0–1)
BUN BLDV-MCNC: 14 MG/DL (ref 6–23)
CALCIUM SERPL-MCNC: 8.7 MG/DL (ref 8.3–10.6)
CHLORIDE BLD-SCNC: 102 MMOL/L (ref 99–110)
CO2: 27 MMOL/L (ref 21–32)
CREAT SERPL-MCNC: 0.8 MG/DL (ref 0.9–1.3)
DIFFERENTIAL TYPE: ABNORMAL
DOSE AMOUNT: NORMAL
DOSE TIME: NORMAL
EOSINOPHILS ABSOLUTE: 0.3 K/CU MM
EOSINOPHILS RELATIVE PERCENT: 4 % (ref 0–3)
FOLATE: >20 NG/ML (ref 3.1–17.5)
GFR AFRICAN AMERICAN: >60 ML/MIN/1.73M2
GFR NON-AFRICAN AMERICAN: >60 ML/MIN/1.73M2
GLUCOSE BLD-MCNC: 106 MG/DL (ref 70–99)
GLUCOSE BLD-MCNC: 123 MG/DL (ref 70–99)
GLUCOSE BLD-MCNC: 147 MG/DL (ref 70–99)
GLUCOSE BLD-MCNC: 156 MG/DL (ref 70–99)
GLUCOSE BLD-MCNC: 99 MG/DL (ref 70–99)
HCT VFR BLD CALC: 35.6 % (ref 42–52)
HEMOGLOBIN: 10.6 GM/DL (ref 13.5–18)
IMMATURE NEUTROPHIL %: 0.4 % (ref 0–0.43)
LYMPHOCYTES ABSOLUTE: 1.9 K/CU MM
LYMPHOCYTES RELATIVE PERCENT: 22.9 % (ref 24–44)
MCH RBC QN AUTO: 25.9 PG (ref 27–31)
MCHC RBC AUTO-ENTMCNC: 29.8 % (ref 32–36)
MCV RBC AUTO: 87 FL (ref 78–100)
MONOCYTES ABSOLUTE: 0.8 K/CU MM
MONOCYTES RELATIVE PERCENT: 9.9 % (ref 0–4)
NUCLEATED RBC %: 0 %
PDW BLD-RTO: 17 % (ref 11.7–14.9)
PHENYTOIN LEVEL: 13.9 UG/ML (ref 10–20)
PLATELET # BLD: 250 K/CU MM (ref 140–440)
PMV BLD AUTO: 8.4 FL (ref 7.5–11.1)
POTASSIUM SERPL-SCNC: 4.1 MMOL/L (ref 3.5–5.1)
RBC # BLD: 4.09 M/CU MM (ref 4.6–6.2)
SEGMENTED NEUTROPHILS ABSOLUTE COUNT: 5.3 K/CU MM
SEGMENTED NEUTROPHILS RELATIVE PERCENT: 62.6 % (ref 36–66)
SODIUM BLD-SCNC: 138 MMOL/L (ref 135–145)
T4 FREE: 0.78 NG/DL (ref 0.9–1.8)
TOTAL IMMATURE NEUTOROPHIL: 0.03 K/CU MM
TOTAL NUCLEATED RBC: 0 K/CU MM
TSH HIGH SENSITIVITY: 2.47 UIU/ML (ref 0.27–4.2)
VITAMIN B-12: 534.9 PG/ML (ref 211–911)
WBC # BLD: 8.5 K/CU MM (ref 4–10.5)

## 2019-08-23 PROCEDURE — 80048 BASIC METABOLIC PNL TOTAL CA: CPT

## 2019-08-23 PROCEDURE — 85025 COMPLETE CBC W/AUTO DIFF WBC: CPT

## 2019-08-23 PROCEDURE — 70551 MRI BRAIN STEM W/O DYE: CPT

## 2019-08-23 PROCEDURE — 2700000000 HC OXYGEN THERAPY PER DAY

## 2019-08-23 PROCEDURE — 82962 GLUCOSE BLOOD TEST: CPT

## 2019-08-23 PROCEDURE — 2580000003 HC RX 258: Performed by: INTERNAL MEDICINE

## 2019-08-23 PROCEDURE — 80185 ASSAY OF PHENYTOIN TOTAL: CPT

## 2019-08-23 PROCEDURE — 83036 HEMOGLOBIN GLYCOSYLATED A1C: CPT

## 2019-08-23 PROCEDURE — 84443 ASSAY THYROID STIM HORMONE: CPT

## 2019-08-23 PROCEDURE — 36415 COLL VENOUS BLD VENIPUNCTURE: CPT

## 2019-08-23 PROCEDURE — 94660 CPAP INITIATION&MGMT: CPT

## 2019-08-23 PROCEDURE — 76536 US EXAM OF HEAD AND NECK: CPT

## 2019-08-23 PROCEDURE — 80307 DRUG TEST PRSMV CHEM ANLYZR: CPT

## 2019-08-23 PROCEDURE — 6370000000 HC RX 637 (ALT 250 FOR IP): Performed by: INTERNAL MEDICINE

## 2019-08-23 PROCEDURE — 2060000000 HC ICU INTERMEDIATE R&B

## 2019-08-23 PROCEDURE — 86800 THYROGLOBULIN ANTIBODY: CPT

## 2019-08-23 PROCEDURE — 6370000000 HC RX 637 (ALT 250 FOR IP): Performed by: HOSPITALIST

## 2019-08-23 PROCEDURE — 84439 ASSAY OF FREE THYROXINE: CPT

## 2019-08-23 PROCEDURE — APPSS30 APP SPLIT SHARED TIME 16-30 MINUTES: Performed by: NURSE PRACTITIONER

## 2019-08-23 PROCEDURE — 99232 SBSQ HOSP IP/OBS MODERATE 35: CPT | Performed by: INTERNAL MEDICINE

## 2019-08-23 PROCEDURE — 94761 N-INVAS EAR/PLS OXIMETRY MLT: CPT

## 2019-08-23 PROCEDURE — 82746 ASSAY OF FOLIC ACID SERUM: CPT

## 2019-08-23 PROCEDURE — 82607 VITAMIN B-12: CPT

## 2019-08-23 PROCEDURE — 6360000002 HC RX W HCPCS: Performed by: INTERNAL MEDICINE

## 2019-08-23 PROCEDURE — 86376 MICROSOMAL ANTIBODY EACH: CPT

## 2019-08-23 RX ORDER — LEVOTHYROXINE SODIUM 0.05 MG/1
50 TABLET ORAL DAILY
Status: DISCONTINUED | OUTPATIENT
Start: 2019-08-23 | End: 2019-08-26 | Stop reason: HOSPADM

## 2019-08-23 RX ORDER — OXYCODONE HYDROCHLORIDE AND ACETAMINOPHEN 5; 325 MG/1; MG/1
1 TABLET ORAL EVERY 4 HOURS PRN
Status: DISCONTINUED | OUTPATIENT
Start: 2019-08-23 | End: 2019-08-26 | Stop reason: HOSPADM

## 2019-08-23 RX ADMIN — OXYCODONE HYDROCHLORIDE AND ACETAMINOPHEN 1 TABLET: 5; 325 TABLET ORAL at 21:59

## 2019-08-23 RX ADMIN — LISINOPRIL 10 MG: 10 TABLET ORAL at 09:24

## 2019-08-23 RX ADMIN — LEVOTHYROXINE SODIUM 50 MCG: 50 TABLET ORAL at 09:35

## 2019-08-23 RX ADMIN — Medication 400 MG: at 21:59

## 2019-08-23 RX ADMIN — Medication 400 MG: at 09:23

## 2019-08-23 RX ADMIN — ACETAMINOPHEN 650 MG: 325 TABLET ORAL at 08:46

## 2019-08-23 RX ADMIN — TAMSULOSIN HYDROCHLORIDE 0.4 MG: 0.4 CAPSULE ORAL at 09:25

## 2019-08-23 RX ADMIN — INSULIN LISPRO 1 UNITS: 100 INJECTION, SOLUTION INTRAVENOUS; SUBCUTANEOUS at 13:24

## 2019-08-23 RX ADMIN — FAMOTIDINE 20 MG: 20 TABLET ORAL at 09:24

## 2019-08-23 RX ADMIN — DOCUSATE SODIUM 100 MG: 100 CAPSULE, LIQUID FILLED ORAL at 21:59

## 2019-08-23 RX ADMIN — CLOPIDOGREL BISULFATE 75 MG: 75 TABLET ORAL at 09:23

## 2019-08-23 RX ADMIN — FAMOTIDINE 20 MG: 20 TABLET ORAL at 21:59

## 2019-08-23 RX ADMIN — METOPROLOL SUCCINATE 25 MG: 25 TABLET, EXTENDED RELEASE ORAL at 09:23

## 2019-08-23 RX ADMIN — DEXTROSE MONOHYDRATE 200 MG PE: 50 INJECTION, SOLUTION INTRAVENOUS at 01:18

## 2019-08-23 RX ADMIN — DOCUSATE SODIUM 100 MG: 100 CAPSULE, LIQUID FILLED ORAL at 09:23

## 2019-08-23 RX ADMIN — INSULIN LISPRO 1 UNITS: 100 INJECTION, SOLUTION INTRAVENOUS; SUBCUTANEOUS at 17:36

## 2019-08-23 RX ADMIN — DEXTROSE MONOHYDRATE 200 MG PE: 50 INJECTION, SOLUTION INTRAVENOUS at 22:00

## 2019-08-23 RX ADMIN — FUROSEMIDE 40 MG: 40 TABLET ORAL at 09:23

## 2019-08-23 ASSESSMENT — PAIN DESCRIPTION - FREQUENCY
FREQUENCY: CONTINUOUS
FREQUENCY: INTERMITTENT

## 2019-08-23 ASSESSMENT — PAIN DESCRIPTION - ONSET
ONSET: ON-GOING
ONSET: ON-GOING

## 2019-08-23 ASSESSMENT — PAIN SCALES - GENERAL
PAINLEVEL_OUTOF10: 7
PAINLEVEL_OUTOF10: 0
PAINLEVEL_OUTOF10: 0
PAINLEVEL_OUTOF10: 8
PAINLEVEL_OUTOF10: 0
PAINLEVEL_OUTOF10: 0
PAINLEVEL_OUTOF10: 8
PAINLEVEL_OUTOF10: 0

## 2019-08-23 ASSESSMENT — PAIN DESCRIPTION - PAIN TYPE
TYPE: ACUTE PAIN
TYPE: CHRONIC PAIN

## 2019-08-23 ASSESSMENT — PAIN DESCRIPTION - PROGRESSION: CLINICAL_PROGRESSION: NOT CHANGED

## 2019-08-23 ASSESSMENT — PAIN - FUNCTIONAL ASSESSMENT
PAIN_FUNCTIONAL_ASSESSMENT: ACTIVITIES ARE NOT PREVENTED
PAIN_FUNCTIONAL_ASSESSMENT: PREVENTS OR INTERFERES SOME ACTIVE ACTIVITIES AND ADLS

## 2019-08-23 ASSESSMENT — PAIN DESCRIPTION - ORIENTATION
ORIENTATION: MID
ORIENTATION: MID

## 2019-08-23 ASSESSMENT — PAIN DESCRIPTION - DESCRIPTORS
DESCRIPTORS: ACHING
DESCRIPTORS: ACHING

## 2019-08-23 ASSESSMENT — PAIN DESCRIPTION - LOCATION
LOCATION: BACK;NECK
LOCATION: NECK

## 2019-08-23 NOTE — PROGRESS NOTES
mutually at the time of the visit with the patient,  NP   and myself. I have spoken with patient, nursing staff and provided written and verbal instructions . The above note has been reviewed and I agree with the assessment, diagnosis, and treatment plan with changes made by me as follows     HPI:  I have reviewed the above HPI  And agree with above   Ramiro Meza is a 48 y. o.year old who and presents with had concerns including Fall. Chief Complaint   Patient presents with    Fall     Please review addendum/changes made to note above   Interval history:  Breathing is still not good  Altered mental status , due to hypercapnia    Physical Exam:  General:  Awake, alert, NAD  Head:normal  Eye:normal  Neck:  No JVD   Chest:  Clear to auscultation, respiration easy  Cardiovascular:  S1 and S2 audible, No added heart sounds,2 a+nkle edema, or volume overload, No evidence of JVD, No crackles  Abdomen:   nontender  Extremities:  2 + edema  Pulses; palpable  Neuro: grossly normal      MEDICAL DECISION MAKING;    I agree with the above plan, which was planned by myself and discussed with NP.     Will sign off      Nayan Golden MD [facility]

## 2019-08-23 NOTE — PROGRESS NOTES
No results for input(s): INR in the last 72 hours. Recent Labs     08/22/19  0440 08/22/19  0858 08/22/19  1243   CKTOTAL 268*  --   --    TROPONINT 0.053* 0.040* 0.030*       I/O last 3 completed shifts: In: 600 [P.O.:600]  Out: -     Intake/Output Summary (Last 24 hours) at 8/23/2019 1505  Last data filed at 8/23/2019 0355  Gross per 24 hour   Intake 600 ml   Output --   Net 600 ml        Assessment/Plan:   1. Metabolic Encephalopathy due to altered mental status etiology unknown- patient is not quite sure why he is very weak and tired he does have a low thyroid which we will have endocrine evaluate  2. Hypothyroidism-patient stated that he is not been diagnosed in the past with a low thyroid level which it is, will consult endocrine for evaluation and treatment. Informed patient it may be contributing to his weakness and lethargy  3. Non-insulin-dependent diabetes mellitus-sliding scale, diabetic diet, monitor blood glucose  4. Obesity due to class III-lifestyle modification with risk  Stratification  5.  Hypertension-lisinopril, metoprolol, monitor blood pressure    DVT Prophylaxis: scd  Diet: DIET GENERAL; Carb Control: 4 carb choices (60 gms)/meal; Dysphagia Soft and Bite-Sized  Code Status: Full Code    Dispo: when stable    Electronically signed by Dc Banks MD on 8/23/2019 at 3:05 PM

## 2019-08-23 NOTE — PROGRESS NOTES
Bowel: Normal. Vasculature: Atherosclerosis. No abdominal aortic aneurysm. Bones and Soft Tissues: Degenerative osseous changes in the visualized spine and pelvis. No obvious acute fracture. Retroperitoneum/Mesentery: No intraperitoneal free air, ascites or fluid collection. No lymphadenopathy in the abdomen or pelvis. No CT evidence of acute injury in the abdomen or pelvis given motion and noise artifact. No obstructive uropathy. Redemonstration of multiple nonobstructing right kidney stones including a large staghorn type calculus. No obvious acute fracture. Ct Head Wo Contrast    Result Date: 8/22/2019  EXAMINATION: CT OF THE HEAD WITHOUT CONTRAST  8/22/2019 5:22 am TECHNIQUE: CT of the head was performed without the administration of intravenous contrast. Dose modulation, iterative reconstruction, and/or weight based adjustment of the mA/kV was utilized to reduce the radiation dose to as low as reasonably achievable. COMPARISON: 05/19/2019 HISTORY: ORDERING SYSTEM PROVIDED HISTORY: somulent TECHNOLOGIST PROVIDED HISTORY: Has a \"code stroke\" or \"stroke alert\" been called? ->No Reason for Exam: fall FINDINGS: BRAIN/VENTRICLES: Evaluation of the posterior and middle cranial fossae is limited secondary to beam hardening artifact from the neck and shoulders. There is no acute intracranial hemorrhage, mass effect or midline shift. No abnormal extra-axial fluid collection. Unchanged slightly asymmetric left periventricular hypoattenuation and probable old bilateral basal ganglia lacunar infarcts. No evidence of recent territorial infarct. There are areas of hypoattenuation in the periventricular white matter and centrum semiovale that are likely related to chronic small vessel ischemic disease. There is no evidence of hydrocephalus. There is intracranial atherosclerosis. ORBITS: The visualized portion of the orbits demonstrate no acute abnormality.  SINUSES: The visualized paranasal sinuses and mastoid air Relative 62.6 36 - 66 %    Lymphocytes % 22.9 (L) 24 - 44 %    Monocytes % 9.9 (H) 0 - 4 %    Eosinophils % 4.0 (H) 0 - 3 %    Basophils % 0.2 0 - 1 %    Segs Absolute 5.3 K/CU MM    Lymphocytes Absolute 1.9 K/CU MM    Monocytes Absolute 0.8 K/CU MM    Eosinophils Absolute 0.3 K/CU MM    Basophils Absolute 0.0 K/CU MM    Nucleated RBC % 0.0 %    Total Nucleated RBC 0.0 K/CU MM    Total Immature Neutrophil 0.03 K/CU MM    Immature Neutrophil % 0.4 0 - 0.43 %   Basic Metabolic Panel    Collection Time: 08/23/19  3:33 AM   Result Value Ref Range    Sodium 138 135 - 145 MMOL/L    Potassium 4.1 3.5 - 5.1 MMOL/L    Chloride 102 99 - 110 mMol/L    CO2 27 21 - 32 MMOL/L    Anion Gap 9 4 - 16    BUN 14 6 - 23 MG/DL    CREATININE 0.8 (L) 0.9 - 1.3 MG/DL    Glucose 106 (H) 70 - 99 MG/DL    Calcium 8.7 8.3 - 10.6 MG/DL    GFR Non-African American >60 >60 mL/min/1.73m2    GFR African American >60 >60 mL/min/1.73m2   T4, Free    Collection Time: 08/23/19  3:33 AM   Result Value Ref Range    T4 Free 0.78 (L) 0.9 - 1.8 NG/DL   TSH without Reflex    Collection Time: 08/23/19  3:33 AM   Result Value Ref Range    TSH, High Sensitivity 2.470 0.270 - 4.20 uIu/ml   Phenytoin Level, Total    Collection Time: 08/23/19  3:33 AM   Result Value Ref Range    Phenytoin Lvl 13.9 10 - 20 UG/ML    DOSE AMOUNT DOSE AMT.  GIVEN - ON DILANTIN     DOSE TIME DOSE TIME GIVEN - ON DILANTIN    Vitamin B12 & Folate    Collection Time: 08/23/19  3:33 AM   Result Value Ref Range    Vitamin B-12 534.9 211 - 911 pg/ml    Folate >20.0 (H) 3.1 - 17.5 NG/ML   POCT Glucose    Collection Time: 08/23/19  7:58 AM   Result Value Ref Range    POC Glucose 99 70 - 99 MG/DL   POCT Glucose    Collection Time: 08/23/19  1:22 PM   Result Value Ref Range    POC Glucose 147 (H) 70 - 99 MG/DL   POCT Glucose    Collection Time: 08/23/19  5:02 PM   Result Value Ref Range    POC Glucose 156 (H) 70 - 99 MG/DL         Medical problems    Patient Active Problem List:     Seizure

## 2019-08-23 NOTE — PROGRESS NOTES
Telemetry tech notified this nurse patient SPO2 is 80,  This nurse advised in the room, This nurse put bypap on patient. Patient SPO2 is not 94%.

## 2019-08-23 NOTE — CONSULTS
Endocrinology   Consult Note  Dear Doctor Shannan Ferris for the Consult     Pt. Was Admitted for : Altered mental state was brought in from nursing home    Reason for Consult:  Hypothyroidism // also has DM     History Obtained From:   EMR patient not able to give much history      HISTORY OF PRESENT ILLNESS:                The patient is a 48 y.o. male with significant past medical history of CAD, diabetes mellitus, hyperlipidemia, non-ST MI status post angioplasty with stent and history of seizure disorder was brought in from nursing home after he fell down with a change of mental state. He was found to have abnormal thyroid function test I was  consulted for better control of blood glucose. And also review of his thyroid function status. Not able to obtain much history from the patient because of his altered mental state      ROS:   Pt's ROS done in detail. Abnormal ROS are noted in Medical and Surgical History Section below: Other Medical History:        Diagnosis Date    CAD (coronary artery disease) 3/19/2014    Diabetes mellitus ([facility] [address]     New diagnosis 3/19/14    Family history of early CAD     Father-age 42's    H/O cardiovascular stress test 5/9/2014    EF 62% normal study    H/O echocardiogram 7/28/15    EF 55-60%. Mild mitral and tricuspid insuff.  H/O echocardiogram 05/09/2018    EF40-50% mild phtn, , TR    History of cardiac catheterization 3/19/2014    3/2014-LAD 99% prox,LAD 80% mid,CX dominant with OM1 50% prox, PDA 80%, RCA 80% mid-PTCA with 1 stent to LAD prox and 2 stents to LAD mid. CX stent in 2nd sitting as OP-Dr Sobeida Durham    History of echocardiogram 3/19/2014    3/2014-Mild LVH. Normal global and regional LVSF. EF 60%.  Mild MR/TR;    Hx of Doppler ultrasound 5/09/14    Peripheral- Normal    Hyperlipemia     Loss of consciousness ([address] 3/18/2014    Multiple lacunar infarcts (HCC)     chronic-basal ganglia region bilaterally    NSTEMI (non-ST elevated myocardial

## 2019-08-23 NOTE — DISCHARGE INSTR - COC
for greater 30 days.      Update Admission H&P: No change in H&P    PHYSICIAN SIGNATURE:  Electronically signed by Herminia Mendez MD on 8/26/19 at 10:00 AM

## 2019-08-24 LAB
ANION GAP SERPL CALCULATED.3IONS-SCNC: 12 MMOL/L (ref 4–16)
BASOPHILS ABSOLUTE: 0 K/CU MM
BASOPHILS RELATIVE PERCENT: 0.1 % (ref 0–1)
BUN BLDV-MCNC: 16 MG/DL (ref 6–23)
CALCIUM SERPL-MCNC: 8.9 MG/DL (ref 8.3–10.6)
CHLORIDE BLD-SCNC: 103 MMOL/L (ref 99–110)
CO2: 26 MMOL/L (ref 21–32)
CREAT SERPL-MCNC: 0.8 MG/DL (ref 0.9–1.3)
DIFFERENTIAL TYPE: ABNORMAL
EOSINOPHILS ABSOLUTE: 0.3 K/CU MM
EOSINOPHILS RELATIVE PERCENT: 4.1 % (ref 0–3)
ESTIMATED AVERAGE GLUCOSE: 117 MG/DL
GFR AFRICAN AMERICAN: >60 ML/MIN/1.73M2
GFR NON-AFRICAN AMERICAN: >60 ML/MIN/1.73M2
GLUCOSE BLD-MCNC: 112 MG/DL (ref 70–99)
GLUCOSE BLD-MCNC: 113 MG/DL (ref 70–99)
GLUCOSE BLD-MCNC: 117 MG/DL (ref 70–99)
GLUCOSE BLD-MCNC: 133 MG/DL (ref 70–99)
GLUCOSE BLD-MCNC: 156 MG/DL (ref 70–99)
HBA1C MFR BLD: 5.7 % (ref 4.2–6.3)
HCT VFR BLD CALC: 31.6 % (ref 42–52)
HEMOGLOBIN: 9.6 GM/DL (ref 13.5–18)
IMMATURE NEUTROPHIL %: 0.3 % (ref 0–0.43)
LYMPHOCYTES ABSOLUTE: 1.8 K/CU MM
LYMPHOCYTES RELATIVE PERCENT: 23.7 % (ref 24–44)
MCH RBC QN AUTO: 25.7 PG (ref 27–31)
MCHC RBC AUTO-ENTMCNC: 30.4 % (ref 32–36)
MCV RBC AUTO: 84.7 FL (ref 78–100)
MONOCYTES ABSOLUTE: 0.6 K/CU MM
MONOCYTES RELATIVE PERCENT: 8.4 % (ref 0–4)
NUCLEATED RBC %: 0 %
PDW BLD-RTO: 16.6 % (ref 11.7–14.9)
PHENYTOIN % FREE: 10.3 % (ref 8–14)
PHENYTOIN % FREE: NORMAL % (ref 8–14)
PHENYTOIN DOSE AMOUNT: NORMAL
PHENYTOIN DOSE FREQUENCY: NORMAL
PHENYTOIN DOSE ROUTE: NORMAL
PHENYTOIN FREE: 1.3 UG/ML (ref 1–2.5)
PHENYTOIN TYPE OF DRAW: NORMAL
PLATELET # BLD: 276 K/CU MM (ref 140–440)
PMV BLD AUTO: 8.2 FL (ref 7.5–11.1)
POTASSIUM SERPL-SCNC: 4 MMOL/L (ref 3.5–5.1)
RBC # BLD: 3.73 M/CU MM (ref 4.6–6.2)
SEGMENTED NEUTROPHILS ABSOLUTE COUNT: 4.7 K/CU MM
SEGMENTED NEUTROPHILS RELATIVE PERCENT: 63.4 % (ref 36–66)
SODIUM BLD-SCNC: 141 MMOL/L (ref 135–145)
T4 FREE: 0.84 NG/DL (ref 0.9–1.8)
TOTAL IMMATURE NEUTOROPHIL: 0.02 K/CU MM
TOTAL NUCLEATED RBC: 0 K/CU MM
TOTAL PHENYTOIN: 12.6 UG/ML (ref 10–20)
TSH HIGH SENSITIVITY: 2.96 UIU/ML (ref 0.27–4.2)
WBC # BLD: 7.5 K/CU MM (ref 4–10.5)

## 2019-08-24 PROCEDURE — 86376 MICROSOMAL ANTIBODY EACH: CPT

## 2019-08-24 PROCEDURE — 86800 THYROGLOBULIN ANTIBODY: CPT

## 2019-08-24 PROCEDURE — 6360000002 HC RX W HCPCS: Performed by: INTERNAL MEDICINE

## 2019-08-24 PROCEDURE — 82962 GLUCOSE BLOOD TEST: CPT

## 2019-08-24 PROCEDURE — 94761 N-INVAS EAR/PLS OXIMETRY MLT: CPT

## 2019-08-24 PROCEDURE — 2580000003 HC RX 258: Performed by: INTERNAL MEDICINE

## 2019-08-24 PROCEDURE — 2700000000 HC OXYGEN THERAPY PER DAY

## 2019-08-24 PROCEDURE — 84443 ASSAY THYROID STIM HORMONE: CPT

## 2019-08-24 PROCEDURE — 6370000000 HC RX 637 (ALT 250 FOR IP): Performed by: HOSPITALIST

## 2019-08-24 PROCEDURE — 84439 ASSAY OF FREE THYROXINE: CPT

## 2019-08-24 PROCEDURE — 85025 COMPLETE CBC W/AUTO DIFF WBC: CPT

## 2019-08-24 PROCEDURE — 99232 SBSQ HOSP IP/OBS MODERATE 35: CPT | Performed by: INTERNAL MEDICINE

## 2019-08-24 PROCEDURE — 94660 CPAP INITIATION&MGMT: CPT

## 2019-08-24 PROCEDURE — 6370000000 HC RX 637 (ALT 250 FOR IP): Performed by: INTERNAL MEDICINE

## 2019-08-24 PROCEDURE — 80048 BASIC METABOLIC PNL TOTAL CA: CPT

## 2019-08-24 PROCEDURE — 2060000000 HC ICU INTERMEDIATE R&B

## 2019-08-24 RX ADMIN — DEXTROSE MONOHYDRATE 200 MG PE: 50 INJECTION, SOLUTION INTRAVENOUS at 10:02

## 2019-08-24 RX ADMIN — DEXTROSE MONOHYDRATE 200 MG PE: 50 INJECTION, SOLUTION INTRAVENOUS at 17:09

## 2019-08-24 RX ADMIN — DOCUSATE SODIUM 100 MG: 100 CAPSULE, LIQUID FILLED ORAL at 20:43

## 2019-08-24 RX ADMIN — LEVOTHYROXINE SODIUM 50 MCG: 50 TABLET ORAL at 06:40

## 2019-08-24 RX ADMIN — LISINOPRIL 10 MG: 10 TABLET ORAL at 09:56

## 2019-08-24 RX ADMIN — DEXTROSE MONOHYDRATE 200 MG PE: 50 INJECTION, SOLUTION INTRAVENOUS at 01:11

## 2019-08-24 RX ADMIN — FAMOTIDINE 20 MG: 20 TABLET ORAL at 20:44

## 2019-08-24 RX ADMIN — METOPROLOL SUCCINATE 25 MG: 25 TABLET, EXTENDED RELEASE ORAL at 09:56

## 2019-08-24 RX ADMIN — INSULIN LISPRO 1 UNITS: 100 INJECTION, SOLUTION INTRAVENOUS; SUBCUTANEOUS at 12:41

## 2019-08-24 RX ADMIN — Medication 400 MG: at 20:43

## 2019-08-24 RX ADMIN — DOCUSATE SODIUM 100 MG: 100 CAPSULE, LIQUID FILLED ORAL at 09:56

## 2019-08-24 RX ADMIN — TAMSULOSIN HYDROCHLORIDE 0.4 MG: 0.4 CAPSULE ORAL at 09:56

## 2019-08-24 RX ADMIN — FUROSEMIDE 40 MG: 40 TABLET ORAL at 09:56

## 2019-08-24 RX ADMIN — FAMOTIDINE 20 MG: 20 TABLET ORAL at 09:56

## 2019-08-24 RX ADMIN — CLOPIDOGREL BISULFATE 75 MG: 75 TABLET ORAL at 09:56

## 2019-08-24 RX ADMIN — Medication 400 MG: at 10:02

## 2019-08-24 ASSESSMENT — PAIN SCALES - GENERAL
PAINLEVEL_OUTOF10: 0
PAINLEVEL_OUTOF10: 0

## 2019-08-24 NOTE — PROGRESS NOTES
8/22/2019  EXAMINATION: CT OF THE HEAD WITHOUT CONTRAST  8/22/2019 5:22 am T  ce of acute fracture or malalignment of the right shoulder. 1. Motion limited evaluation. Right infrahilar airspace opacities may reflect pneumonia or atelectasis. 2. No acute bony abnormality of the right shoulder or imaged osseous structures. 3. Dilated main pulmonary artery may be related to pulmonary hypertension. 4. Coronary artery disease. Ct Cervical Spine Wo Contrast    Result Date: 8/22/2019  EXAMINATION: CT OF THE CERVICAL SPINE WITHOUT CONTRAST 8/22/2019 5:22 am .  . No obvious acute cervical spine fracture given motion and noise artifact Multilevel moderate to severe cervical spondylosis is centered at C5-C6 and C6-C7. Us Head Neck Soft Tissue Thyroid    Result Date: 8/23/2019  EXAMINATION: THYROID ULTRASOUND 8/23/2019 COMPARISON: None. HISTORY: ORDERING SYSTEM PROVIDED HISTORY: THYROID DISEASE TECHNOLOGIST PROVIDED HISTORY: Reason for Exam: abnormal thyroid labs Acuity: Acute Type of Exam: Initial FINDINGS: Right thyroid lobe:  3.7 x 2.2 x 1.7 cm Left thyroid lobe:  3.2 x 1.8 x 1.1 cm Isthmus:  3 mm Thyroid Gland:  Thyroid gland demonstrates normal echotexture and vascularity. Nodules: No thyroid nodules are present. Cervical lymphadenopathy: No abnormal lymph nodes in the imaged portions of the neck. Unremarkable thyroid ultrasound. Mri Brain Wo Contrast    Result Date: 8/23/2019  EXAMINATION: MRI OF THE BRAIN WITHOUT CONTRAST  8/23/2019 10:24      1. No evidence of an acute infarct. 2. Cerebral parenchymal volume loss with chronic microvascular white matter ischemic disease. 3. Chronic infarcts noted in the frontal lobes and basal ganglia.        Scheduled Medicines   Medications:    levothyroxine  50 mcg Oral Daily    clopidogrel  75 mg Oral Daily    docusate sodium  100 mg Oral BID    [Held by provider] gabapentin  100 mg Oral TID    furosemide  40 mg Oral Daily    lisinopril  10 mg Oral Daily

## 2019-08-25 LAB
ANION GAP SERPL CALCULATED.3IONS-SCNC: 13 MMOL/L (ref 4–16)
BASOPHILS ABSOLUTE: 0 K/CU MM
BASOPHILS RELATIVE PERCENT: 0.4 % (ref 0–1)
BUN BLDV-MCNC: 12 MG/DL (ref 6–23)
CALCIUM SERPL-MCNC: 9.6 MG/DL (ref 8.3–10.6)
CHLORIDE BLD-SCNC: 101 MMOL/L (ref 99–110)
CO2: 28 MMOL/L (ref 21–32)
CREAT SERPL-MCNC: 0.7 MG/DL (ref 0.9–1.3)
DIFFERENTIAL TYPE: ABNORMAL
EOSINOPHILS ABSOLUTE: 0.4 K/CU MM
EOSINOPHILS RELATIVE PERCENT: 4.8 % (ref 0–3)
GFR AFRICAN AMERICAN: >60 ML/MIN/1.73M2
GFR NON-AFRICAN AMERICAN: >60 ML/MIN/1.73M2
GLUCOSE BLD-MCNC: 100 MG/DL (ref 70–99)
GLUCOSE BLD-MCNC: 116 MG/DL (ref 70–99)
GLUCOSE BLD-MCNC: 140 MG/DL (ref 70–99)
GLUCOSE BLD-MCNC: 143 MG/DL (ref 70–99)
GLUCOSE BLD-MCNC: 163 MG/DL (ref 70–99)
HCT VFR BLD CALC: 33.3 % (ref 42–52)
HEMOGLOBIN: 10.3 GM/DL (ref 13.5–18)
IMMATURE NEUTROPHIL %: 0.4 % (ref 0–0.43)
LYMPHOCYTES ABSOLUTE: 1.8 K/CU MM
LYMPHOCYTES RELATIVE PERCENT: 23.3 % (ref 24–44)
MCH RBC QN AUTO: 26 PG (ref 27–31)
MCHC RBC AUTO-ENTMCNC: 30.9 % (ref 32–36)
MCV RBC AUTO: 84.1 FL (ref 78–100)
MONOCYTES ABSOLUTE: 0.6 K/CU MM
MONOCYTES RELATIVE PERCENT: 8.3 % (ref 0–4)
NUCLEATED RBC %: 0 %
PDW BLD-RTO: 16.2 % (ref 11.7–14.9)
PLATELET # BLD: 286 K/CU MM (ref 140–440)
PMV BLD AUTO: 8.3 FL (ref 7.5–11.1)
POTASSIUM SERPL-SCNC: 4.1 MMOL/L (ref 3.5–5.1)
RBC # BLD: 3.96 M/CU MM (ref 4.6–6.2)
SEGMENTED NEUTROPHILS ABSOLUTE COUNT: 4.8 K/CU MM
SEGMENTED NEUTROPHILS RELATIVE PERCENT: 62.8 % (ref 36–66)
SODIUM BLD-SCNC: 142 MMOL/L (ref 135–145)
TOTAL IMMATURE NEUTOROPHIL: 0.03 K/CU MM
TOTAL NUCLEATED RBC: 0 K/CU MM
WBC # BLD: 7.7 K/CU MM (ref 4–10.5)

## 2019-08-25 PROCEDURE — 2060000000 HC ICU INTERMEDIATE R&B

## 2019-08-25 PROCEDURE — 94761 N-INVAS EAR/PLS OXIMETRY MLT: CPT

## 2019-08-25 PROCEDURE — 94660 CPAP INITIATION&MGMT: CPT

## 2019-08-25 PROCEDURE — 36415 COLL VENOUS BLD VENIPUNCTURE: CPT

## 2019-08-25 PROCEDURE — 82962 GLUCOSE BLOOD TEST: CPT

## 2019-08-25 PROCEDURE — 80048 BASIC METABOLIC PNL TOTAL CA: CPT

## 2019-08-25 PROCEDURE — 2580000003 HC RX 258: Performed by: INTERNAL MEDICINE

## 2019-08-25 PROCEDURE — 99232 SBSQ HOSP IP/OBS MODERATE 35: CPT | Performed by: INTERNAL MEDICINE

## 2019-08-25 PROCEDURE — 6360000002 HC RX W HCPCS: Performed by: INTERNAL MEDICINE

## 2019-08-25 PROCEDURE — 2700000000 HC OXYGEN THERAPY PER DAY

## 2019-08-25 PROCEDURE — 85025 COMPLETE CBC W/AUTO DIFF WBC: CPT

## 2019-08-25 PROCEDURE — 6370000000 HC RX 637 (ALT 250 FOR IP): Performed by: INTERNAL MEDICINE

## 2019-08-25 PROCEDURE — 6370000000 HC RX 637 (ALT 250 FOR IP): Performed by: HOSPITALIST

## 2019-08-25 RX ORDER — AMOXICILLIN AND CLAVULANATE POTASSIUM 875; 125 MG/1; MG/1
1 TABLET, FILM COATED ORAL EVERY 12 HOURS SCHEDULED
Status: DISCONTINUED | OUTPATIENT
Start: 2019-08-25 | End: 2019-08-25

## 2019-08-25 RX ORDER — PHENYTOIN 50 MG/1
200 TABLET, CHEWABLE ORAL 3 TIMES DAILY
Status: DISCONTINUED | OUTPATIENT
Start: 2019-08-25 | End: 2019-08-26 | Stop reason: HOSPADM

## 2019-08-25 RX ADMIN — FAMOTIDINE 20 MG: 20 TABLET ORAL at 08:17

## 2019-08-25 RX ADMIN — FAMOTIDINE 20 MG: 20 TABLET ORAL at 20:43

## 2019-08-25 RX ADMIN — DEXTROSE MONOHYDRATE 200 MG PE: 50 INJECTION, SOLUTION INTRAVENOUS at 09:16

## 2019-08-25 RX ADMIN — DOCUSATE SODIUM 100 MG: 100 CAPSULE, LIQUID FILLED ORAL at 20:43

## 2019-08-25 RX ADMIN — CLOPIDOGREL BISULFATE 75 MG: 75 TABLET ORAL at 08:16

## 2019-08-25 RX ADMIN — LISINOPRIL 10 MG: 10 TABLET ORAL at 08:16

## 2019-08-25 RX ADMIN — PHENYTOIN 200 MG: 50 TABLET, CHEWABLE ORAL at 20:43

## 2019-08-25 RX ADMIN — METOPROLOL SUCCINATE 25 MG: 25 TABLET, EXTENDED RELEASE ORAL at 08:16

## 2019-08-25 RX ADMIN — TAMSULOSIN HYDROCHLORIDE 0.4 MG: 0.4 CAPSULE ORAL at 08:17

## 2019-08-25 RX ADMIN — LEVOTHYROXINE SODIUM 50 MCG: 50 TABLET ORAL at 06:23

## 2019-08-25 RX ADMIN — Medication 400 MG: at 08:17

## 2019-08-25 RX ADMIN — FUROSEMIDE 40 MG: 40 TABLET ORAL at 08:17

## 2019-08-25 RX ADMIN — DOCUSATE SODIUM 100 MG: 100 CAPSULE, LIQUID FILLED ORAL at 08:16

## 2019-08-25 RX ADMIN — Medication 400 MG: at 20:42

## 2019-08-25 RX ADMIN — DEXTROSE MONOHYDRATE 200 MG PE: 50 INJECTION, SOLUTION INTRAVENOUS at 01:03

## 2019-08-25 RX ADMIN — OXYCODONE HYDROCHLORIDE AND ACETAMINOPHEN 1 TABLET: 5; 325 TABLET ORAL at 20:42

## 2019-08-25 ASSESSMENT — PAIN SCALES - GENERAL
PAINLEVEL_OUTOF10: 2
PAINLEVEL_OUTOF10: 8
PAINLEVEL_OUTOF10: 0
PAINLEVEL_OUTOF10: 0

## 2019-08-25 ASSESSMENT — PAIN DESCRIPTION - PROGRESSION: CLINICAL_PROGRESSION: NOT CHANGED

## 2019-08-25 ASSESSMENT — PAIN DESCRIPTION - ONSET: ONSET: GRADUAL

## 2019-08-25 ASSESSMENT — PAIN DESCRIPTION - FREQUENCY: FREQUENCY: INTERMITTENT

## 2019-08-25 ASSESSMENT — PAIN DESCRIPTION - LOCATION: LOCATION: BACK

## 2019-08-25 ASSESSMENT — PAIN DESCRIPTION - DESCRIPTORS: DESCRIPTORS: ACHING

## 2019-08-25 ASSESSMENT — PAIN DESCRIPTION - ORIENTATION: ORIENTATION: LOWER;MID

## 2019-08-25 ASSESSMENT — PAIN - FUNCTIONAL ASSESSMENT: PAIN_FUNCTIONAL_ASSESSMENT: ACTIVITIES ARE NOT PREVENTED

## 2019-08-25 ASSESSMENT — PAIN DESCRIPTION - PAIN TYPE: TYPE: CHRONIC PAIN

## 2019-08-25 NOTE — PROGRESS NOTES
NEUROLOGY NOTE  DR. Amelia Saldana MD.  -------------------------------------------------  Subjective:    Pt denies any new symptoms    Pt is more awake    Pt follows simple commands    Moves extremities    Objective:    /72   Pulse 77   Temp 98.1 °F (36.7 °C) (Oral)   Resp 16   Ht 5' 6\" (1.676 m)   Wt (!) 353 lb 13.4 oz (160.5 kg)   SpO2 (!) 89%   BMI 57.11 kg/m²   HEENT nl      Neuro exam    Alert Oriented  X 2  Follow simple commands  Old dysarthria  EOMI Pupils 3 mm mian  Moves extremities      RADIOLOGY  -----------------    Ct Abdomen Pelvis Wo Contrast    Result Date: 8/22/2019  EXAMINATION: CT OF THE ABDOMEN AND PELVIS WITHOUT CONTRAST 8/22/2019 5:21 am TECHNIQUE: CT of the abdomen and pelvis was performed without the administration of intravenous contrast. Multiplanar reformatted images are provided for review. Dose modulation, iterative reconstruction, and/or weight based adjustment of the mA/kV was utilized to reduce the radiation dose to as low as reasonably achievable. COMPARISON: May 19, 2019. HISTORY: ORDERING SYSTEM PROVIDED HISTORY: fall, hip pain, morbidly obese TECHNOLOGIST PROVIDED HISTORY: Reason for Exam: fall. Extremely limited due to pt circumfrance he was getting stuck in the scanner FINDINGS: Suboptimal evaluation of the solid abdominal organs and vasculature due to lack of intravenous contrast. Partial exclusion of the left lateral abdomen. Image quality is degraded by motion and noise artifact. Lower Chest: Please refer to concurrent chest CT report for intrathoracic findings. Liver: Normal. Gallbladder and Bile Ducts: Normal. Spleen: Normal. Adrenal Glands: Normal. Pancreas: Normal. Genitourinary: Redemonstration of large staghorn type calculus in the right kidney with multiple additional nonobstructing stones in the right kidney. Normal left kidney. No obstructive uropathy. Both ureters are normal in course and caliber. The urinary bladder is unremarkable.   Normal prostate gland and seminal vesicles. . Bowel: Normal. Vasculature: Atherosclerosis. No abdominal aortic aneurysm. Bones and Soft Tissues: Degenerative osseous changes in the visualized spine and pelvis. No obvious acute fracture. Retroperitoneum/Mesentery: No intraperitoneal free air, ascites or fluid collection. No lymphadenopathy in the abdomen or pelvis. No CT evidence of acute injury in the abdomen or pelvis given motion and noise artifact. No obstructive uropathy. Redemonstration of multiple nonobstructing right kidney stones including a large staghorn type calculus. No obvious acute fracture. Ct Head Wo Contrast    Result Date: 8/22/2019  EXAMINATION: CT OF THE HEAD WITHOUT CONTRAST  8/22/2019 5:22 am TECHNIQUE: CT of the head was performed without the administration of intravenous contrast. Dose modulation, iterative reconstruction, and/or weight based adjustment of the mA/kV was utilized to reduce the radiation dose to as low as reasonably achievable. COMPARISON: 05/19/2019 HISTORY: ORDERING SYSTEM PROVIDED HISTORY: somulent TECHNOLOGIST PROVIDED HISTORY: Has a \"code stroke\" or \"stroke alert\" been called? ->No Reason for Exam: fall FINDINGS: BRAIN/VENTRICLES: Evaluation of the posterior and middle cranial fossae is limited secondary to beam hardening artifact from the neck and shoulders. There is no acute intracranial hemorrhage, mass effect or midline shift. No abnormal extra-axial fluid collection. Unchanged slightly asymmetric left periventricular hypoattenuation and probable old bilateral basal ganglia lacunar infarcts. No evidence of recent territorial infarct. There are areas of hypoattenuation in the periventricular white matter and centrum semiovale that are likely related to chronic small vessel ischemic disease. There is no evidence of hydrocephalus. There is intracranial atherosclerosis. ORBITS: The visualized portion of the orbits demonstrate no acute abnormality.  SINUSES: The visualized paranasal sinuses and mastoid air cells demonstrate no acute abnormality. SOFT TISSUES/SKULL:  No acute abnormality of the visualized skull or soft tissues. Limited evaluation with no evidence of acute intracranial abnormality. Ct Chest Wo Contrast    Result Date: 8/22/2019  EXAMINATION: CT OF THE CHEST WITHOUT CONTRAST 8/22/2019 5:21 am TECHNIQUE: CT of the chest was performed without the administration of intravenous contrast. Multiplanar reformatted images are provided for review. Dose modulation, iterative reconstruction, and/or weight based adjustment of the mA/kV was utilized to reduce the radiation dose to as low as reasonably achievable. COMPARISON: None. HISTORY: ORDERING SYSTEM PROVIDED HISTORY: fall, shoulder pain on right, obese, somulent FINDINGS: Mediastinum: There is no evidence of mediastinal adenopathy. Coronary artery disease is noted. The main pulmonary artery is dilated to 3.6 cm, which can be seen in the setting of pulmonary hypertension. Lungs/pleura: Right infrahilar airspace opacities may be related to atelectasis or pneumonia, however motion limits evaluation. No effusion or pneumothorax. Upper Abdomen: Please see separately dictated CT of the abdomen and pelvis for findings below the diaphragm. Soft Tissues/Bones: No acute abnormality of the bones or soft tissues. No evidence of acute fracture or malalignment of the right shoulder. 1. Motion limited evaluation. Right infrahilar airspace opacities may reflect pneumonia or atelectasis. 2. No acute bony abnormality of the right shoulder or imaged osseous structures. 3. Dilated main pulmonary artery may be related to pulmonary hypertension. 4. Coronary artery disease.      Ct Cervical Spine Wo Contrast    Result Date: 8/22/2019  EXAMINATION: CT OF THE CERVICAL SPINE WITHOUT CONTRAST 8/22/2019 5:22 am TECHNIQUE: CT of the cervical spine was performed without the administration of intravenous contrast. Multiplanar

## 2019-08-25 NOTE — PROGRESS NOTES
paranasal sinuses and mastoid air cells demonstrate no acute abnormality. SOFT TISSUES/SKULL:  No acute abnormality of the visualized skull or soft tissues. Limited evaluation with no evidence of acute intracranial abnormality. Ct Chest Wo Contrast    Result Date: 8/22/2019  EXAMINATION: CT OF THE CHEST WITHOUT CONTRAST 8/22/2019 5:21 am TECHNIQUE: CT of the chest was performed without the administration of intravenous contrast. Multiplanar reformatted images are provided for review. Dose modulation, iterative reconstruction, and/or weight based adjustment of the mA/kV was utilized to reduce the radiation dose to as low as reasonably achievable. COMPARISON: None. HISTORY: ORDERING SYSTEM PROVIDED HISTORY: fall, shoulder pain on right, obese, somulent FINDINGS: Mediastinum: There is no evidence of mediastinal adenopathy. Coronary artery disease is noted. The main pulmonary artery is dilated to 3.6 cm, which can be seen in the setting of pulmonary hypertension. Lungs/pleura: Right infrahilar airspace opacities may be related to atelectasis or pneumonia, however motion limits evaluation. No effusion or pneumothorax. Upper Abdomen: Please see separately dictated CT of the abdomen and pelvis for findings below the diaphragm. Soft Tissues/Bones: No acute abnormality of the bones or soft tissues. No evidence of acute fracture or malalignment of the right shoulder. 1. Motion limited evaluation. Right infrahilar airspace opacities may reflect pneumonia or atelectasis. 2. No acute bony abnormality of the right shoulder or imaged osseous structures. 3. Dilated main pulmonary artery may be related to pulmonary hypertension. 4. Coronary artery disease.      Ct Cervical Spine Wo Contrast    Result Date: 8/22/2019  EXAMINATION: CT OF THE CERVICAL SPINE WITHOUT CONTRAST 8/22/2019 5:22 am TECHNIQUE: CT of the cervical spine was performed without the administration of intravenous contrast. Multiplanar reformatted Differential Type AUTOMATED DIFFERENTIAL     Segs Relative 62.8 36 - 66 %    Lymphocytes % 23.3 (L) 24 - 44 %    Monocytes % 8.3 (H) 0 - 4 %    Eosinophils % 4.8 (H) 0 - 3 %    Basophils % 0.4 0 - 1 %    Segs Absolute 4.8 K/CU MM    Lymphocytes Absolute 1.8 K/CU MM    Monocytes Absolute 0.6 K/CU MM    Eosinophils Absolute 0.4 K/CU MM    Basophils Absolute 0.0 K/CU MM    Nucleated RBC % 0.0 %    Total Nucleated RBC 0.0 K/CU MM    Total Immature Neutrophil 0.03 K/CU MM    Immature Neutrophil % 0.4 0 - 0.43 %   Basic Metabolic Panel    Collection Time: 08/25/19  3:47 AM   Result Value Ref Range    Sodium 142 135 - 145 MMOL/L    Potassium 4.1 3.5 - 5.1 MMOL/L    Chloride 101 99 - 110 mMol/L    CO2 28 21 - 32 MMOL/L    Anion Gap 13 4 - 16    BUN 12 6 - 23 MG/DL    CREATININE 0.7 (L) 0.9 - 1.3 MG/DL    Glucose 116 (H) 70 - 99 MG/DL    Calcium 9.6 8.3 - 10.6 MG/DL    GFR Non-African American >60 >60 mL/min/1.73m2    GFR African American >60 >60 mL/min/1.73m2   POCT Glucose    Collection Time: 08/25/19  8:25 AM   Result Value Ref Range    POC Glucose 100 (H) 70 - 99 MG/DL   POCT Glucose    Collection Time: 08/25/19  1:17 PM   Result Value Ref Range    POC Glucose 163 (H) 70 - 99 MG/DL   POCT Glucose    Collection Time: 08/25/19  5:14 PM   Result Value Ref Range    POC Glucose 140 (H) 70 - 99 MG/DL         Medical problems    Patient Active Problem List:     Seizure ([facility] [address]     NSTEMI (non-ST elevated myocardial infarction) ([facility] [address]     Claudication ([facility])     S/P primary angioplasty with coronary stent     Hyperlipemia     Coronary arteriosclerosis in native artery     Diabetes mellitus type 2, uncontrolled, without complications ([facility])     Tobacco use disorder     Low back pain with sciatica     Left knee pain     Mixed hyperlipidemia     Hypertensive disorder     Renal stone     Poor hygiene     Angina pectoris, unstable (HCC)     Primary osteoarthritis of left knee     Primary osteoarthritis of right knee     Numbness

## 2019-08-25 NOTE — PROGRESS NOTES
28   BUN 14 16 12   CREATININE 0.8* 0.8* 0.7*     No results for input(s): AST, ALT, ALB, BILIDIR, BILITOT, ALKPHOS in the last 72 hours. No results for input(s): INR in the last 72 hours. No results for input(s): CKTOTAL, CKMB, CKMBINDEX, TROPONINT in the last 72 hours. I/O last 3 completed shifts: In: 540 [P.O.:540]  Out: -     Intake/Output Summary (Last 24 hours) at 8/25/2019 1323  Last data filed at 8/25/2019 0332  Gross per 24 hour   Intake 180 ml   Output --   Net 180 ml        Assessment/Plan:   1. Metabolic Encephalopathy due to altered mental status etiology unknown- patient is not quite sure why he is very weak and tired he does have a low thyroid which we will have endocrine evaluate  2. Hypothyroidism-patient stated that he is not been diagnosed in the past with a low thyroid level which it is, will consult endocrine for evaluation and treatment. Informed patient it may be contributing to his weakness and lethargy  3. Non-insulin-dependent diabetes mellitus-sliding scale, diabetic diet, monitor blood glucose  4. Obesity due to class III-lifestyle modification with risk  Stratification  5.  Hypertension-lisinopril, metoprolol, monitor blood pressure    DVT Prophylaxis: scd  Diet: DIET GENERAL; Carb Control: 4 carb choices (60 gms)/meal; Dysphagia Soft and Bite-Sized  Code Status: Full Code    Dispo: when stable    Electronically signed by Yas Bates MD on 8/25/2019 at 1:23 PM

## 2019-08-25 NOTE — PROGRESS NOTES
shoulder. 1. Motion limited evaluation. Right infrahilar airspace opacities may reflect pneumonia or atelectasis. 2. No acute bony abnormality of the right shoulder or imaged osseous structures. 3. Dilated main pulmonary artery may be related to pulmonary hypertension. 4. Coronary artery disease. Ct Cervical Spine Wo Contrast    Result Date: 8/22/2019  EXAMINATION: CT OF THE CERVICAL SPINE WITHOUT CONTRAST 8/22/2019 5:22 am .  . No obvious acute cervical spine fracture given motion and noise artifact Multilevel moderate to severe cervical spondylosis is centered at C5-C6 and C6-C7. Us Head Neck Soft Tissue Thyroid    Result Date: 8/23/2019  EXAMINATION: THYROID ULTRASOUND 8/23/2019 COMPARISON: None. HISTORY: ORDERING SYSTEM PROVIDED HISTORY: THYROID DISEASE TECHNOLOGIST PROVIDED HISTORY: Reason for Exam: abnormal thyroid labs Acuity: Acute Type of Exam: Initial FINDINGS: Right thyroid lobe:  3.7 x 2.2 x 1.7 cm Left thyroid lobe:  3.2 x 1.8 x 1.1 cm Isthmus:  3 mm Thyroid Gland:  Thyroid gland demonstrates normal echotexture and vascularity. Nodules: No thyroid nodules are present. Cervical lymphadenopathy: No abnormal lymph nodes in the imaged portions of the neck. Unremarkable thyroid ultrasound. Mri Brain Wo Contrast    Result Date: 8/23/2019  EXAMINATION: MRI OF THE BRAIN WITHOUT CONTRAST  8/23/2019 10:24      1. No evidence of an acute infarct. 2. Cerebral parenchymal volume loss with chronic microvascular white matter ischemic disease. 3. Chronic infarcts noted in the frontal lobes and basal ganglia.        Scheduled Medicines   Medications:    levothyroxine  50 mcg Oral Daily    clopidogrel  75 mg Oral Daily    docusate sodium  100 mg Oral BID    [Held by provider] gabapentin  100 mg Oral TID    furosemide  40 mg Oral Daily    lisinopril  10 mg Oral Daily    magnesium oxide  400 mg Oral BID    tamsulosin  0.4 mg Oral Daily    famotidine  20 mg Oral BID    metoprolol succinate disease without esophagitis     History of percutaneous coronary intervention     Muscle weakness     Secondary diabetes mellitus ([facility] [address]     Metabolic encephalopathy     Acute metabolic encephalopathy     Encephalopathy      Plan:     1. Reviewed POC blood glucose . Labs and X ray results   2. Reviewed Current Medicines   3. On Correction bolus Humalog/ Basal Lantus Insulin regime /    4. Monitor Blood glucose frequently   5. Modified  the dose of Insulin/ other medicines as needed   6. Also on Levothyroxine   7. Will follow     .      Thai Moore MD

## 2019-08-26 VITALS
SYSTOLIC BLOOD PRESSURE: 143 MMHG | WEIGHT: 315 LBS | RESPIRATION RATE: 19 BRPM | HEIGHT: 66 IN | BODY MASS INDEX: 50.62 KG/M2 | DIASTOLIC BLOOD PRESSURE: 88 MMHG | HEART RATE: 90 BPM | OXYGEN SATURATION: 92 % | TEMPERATURE: 99.3 F

## 2019-08-26 LAB
ANION GAP SERPL CALCULATED.3IONS-SCNC: 13 MMOL/L (ref 4–16)
ANTITHYROGLOBULIN AB: <0.9 IU/ML (ref 0–4)
ANTITHYROGLOBULIN AB: <0.9 IU/ML (ref 0–4)
ANTITHYROGLOBULIN AB: NORMAL IU/ML (ref 0–4)
ANTITHYROGLOBULIN AB: NORMAL IU/ML (ref 0–4)
ANTITHYROID MICORSOMAL: 2 IU/ML (ref 0–9)
ANTITHYROID MICORSOMAL: 2 IU/ML (ref 0–9)
ANTITHYROID MICORSOMAL: NORMAL IU/ML (ref 0–9)
ANTITHYROID MICORSOMAL: NORMAL IU/ML (ref 0–9)
BASOPHILS ABSOLUTE: 0 K/CU MM
BASOPHILS RELATIVE PERCENT: 0.4 % (ref 0–1)
BUN BLDV-MCNC: 12 MG/DL (ref 6–23)
CALCIUM SERPL-MCNC: 9.3 MG/DL (ref 8.3–10.6)
CHLORIDE BLD-SCNC: 100 MMOL/L (ref 99–110)
CO2: 27 MMOL/L (ref 21–32)
CREAT SERPL-MCNC: 0.7 MG/DL (ref 0.9–1.3)
DIFFERENTIAL TYPE: ABNORMAL
EOSINOPHILS ABSOLUTE: 0.5 K/CU MM
EOSINOPHILS RELATIVE PERCENT: 6 % (ref 0–3)
GFR AFRICAN AMERICAN: >60 ML/MIN/1.73M2
GFR NON-AFRICAN AMERICAN: >60 ML/MIN/1.73M2
GLUCOSE BLD-MCNC: 116 MG/DL (ref 70–99)
GLUCOSE BLD-MCNC: 151 MG/DL (ref 70–99)
GLUCOSE BLD-MCNC: 185 MG/DL (ref 70–99)
HCT VFR BLD CALC: 32.9 % (ref 42–52)
HEMOGLOBIN: 10.1 GM/DL (ref 13.5–18)
IMMATURE NEUTROPHIL %: 0.4 % (ref 0–0.43)
LYMPHOCYTES ABSOLUTE: 2 K/CU MM
LYMPHOCYTES RELATIVE PERCENT: 27.2 % (ref 24–44)
MCH RBC QN AUTO: 25.9 PG (ref 27–31)
MCHC RBC AUTO-ENTMCNC: 30.7 % (ref 32–36)
MCV RBC AUTO: 84.4 FL (ref 78–100)
MONOCYTES ABSOLUTE: 0.6 K/CU MM
MONOCYTES RELATIVE PERCENT: 8.2 % (ref 0–4)
NUCLEATED RBC %: 0 %
PDW BLD-RTO: 16.5 % (ref 11.7–14.9)
PLATELET # BLD: 306 K/CU MM (ref 140–440)
PMV BLD AUTO: 8 FL (ref 7.5–11.1)
POTASSIUM SERPL-SCNC: 3.9 MMOL/L (ref 3.5–5.1)
RBC # BLD: 3.9 M/CU MM (ref 4.6–6.2)
SEGMENTED NEUTROPHILS ABSOLUTE COUNT: 4.3 K/CU MM
SEGMENTED NEUTROPHILS RELATIVE PERCENT: 57.8 % (ref 36–66)
SODIUM BLD-SCNC: 140 MMOL/L (ref 135–145)
TOTAL IMMATURE NEUTOROPHIL: 0.03 K/CU MM
TOTAL NUCLEATED RBC: 0 K/CU MM
WBC # BLD: 7.4 K/CU MM (ref 4–10.5)

## 2019-08-26 PROCEDURE — 36415 COLL VENOUS BLD VENIPUNCTURE: CPT

## 2019-08-26 PROCEDURE — 6370000000 HC RX 637 (ALT 250 FOR IP): Performed by: HOSPITALIST

## 2019-08-26 PROCEDURE — 82962 GLUCOSE BLOOD TEST: CPT

## 2019-08-26 PROCEDURE — 85025 COMPLETE CBC W/AUTO DIFF WBC: CPT

## 2019-08-26 PROCEDURE — 80048 BASIC METABOLIC PNL TOTAL CA: CPT

## 2019-08-26 PROCEDURE — 6370000000 HC RX 637 (ALT 250 FOR IP): Performed by: INTERNAL MEDICINE

## 2019-08-26 RX ORDER — LEVOTHYROXINE SODIUM 0.05 MG/1
50 TABLET ORAL DAILY
Qty: 30 TABLET | Refills: 0 | DISCHARGE
Start: 2019-08-27

## 2019-08-26 RX ORDER — PHENYTOIN 50 MG/1
200 TABLET, CHEWABLE ORAL 3 TIMES DAILY
Qty: 90 TABLET | Refills: 0 | Status: ON HOLD | DISCHARGE
Start: 2019-08-26 | End: 2019-09-04 | Stop reason: HOSPADM

## 2019-08-26 RX ADMIN — Medication 400 MG: at 09:41

## 2019-08-26 RX ADMIN — LEVOTHYROXINE SODIUM 50 MCG: 50 TABLET ORAL at 06:12

## 2019-08-26 RX ADMIN — PHENYTOIN 200 MG: 50 TABLET, CHEWABLE ORAL at 09:50

## 2019-08-26 RX ADMIN — LISINOPRIL 10 MG: 10 TABLET ORAL at 09:42

## 2019-08-26 RX ADMIN — CLOPIDOGREL BISULFATE 75 MG: 75 TABLET ORAL at 09:41

## 2019-08-26 RX ADMIN — INSULIN LISPRO 1 UNITS: 100 INJECTION, SOLUTION INTRAVENOUS; SUBCUTANEOUS at 12:03

## 2019-08-26 RX ADMIN — METOPROLOL SUCCINATE 25 MG: 25 TABLET, EXTENDED RELEASE ORAL at 09:41

## 2019-08-26 RX ADMIN — PHENYTOIN 200 MG: 50 TABLET, CHEWABLE ORAL at 14:02

## 2019-08-26 RX ADMIN — DOCUSATE SODIUM 100 MG: 100 CAPSULE, LIQUID FILLED ORAL at 09:41

## 2019-08-26 RX ADMIN — FAMOTIDINE 20 MG: 20 TABLET ORAL at 09:42

## 2019-08-26 RX ADMIN — FUROSEMIDE 40 MG: 40 TABLET ORAL at 09:41

## 2019-08-26 RX ADMIN — TAMSULOSIN HYDROCHLORIDE 0.4 MG: 0.4 CAPSULE ORAL at 09:42

## 2019-08-26 ASSESSMENT — PAIN SCALES - GENERAL: PAINLEVEL_OUTOF10: 0

## 2019-08-26 NOTE — CARE COORDINATION
Notified Jacque Cedeño at [facility] of discharge for patient today and she voiced understanding. Transport arranged with [facility] to  @ 2:30pm. First available for bariatric crew since patient is over 300 lbs. FARSHAD also notified Barby of discharge time. She voiced understanding and stated that she will get the discharge orders off Epic herself once they are completed, no need to fax them.

## 2019-08-26 NOTE — DISCHARGE SUMMARY
Discharge Summary    Patient ID   Samuel Baldwin   1965  [id number]    Primary Care:   Dolly Parks MD    Admit date: 8/22/2019   Discharge date: 8/26/2019    Medical Record number: [id number]   Admitting Physician: Brian Rob MD   Discharge Physician: Florian Romeo MD    Consultants: cardiology and neurology    Procedures: none    Discharge Diagnoses:      Patient Active Problem List   Diagnosis Code    Seizure [facility]) R56.9    NSTEMI (non-ST elevated myocardial infarction) ([facility] I21.4    Claudication ([facility] I73.9    S/P primary angioplasty with coronary stent Z95.5    Hyperlipemia E78.5    Coronary arteriosclerosis in native artery I25.10    Diabetes mellitus type 2, uncontrolled, [facility] complications [facility] S93.78    Tobacco use disorder F17.200    Low back pain with sciatica M54.40    Left knee pain M25.562    Mixed hyperlipidemia E78.2    Hypertensive disorder I10    Renal stone N20.0    Poor hygiene R46.0    Angina pectoris, unstable ([facility] I20.0    Primary osteoarthritis of left knee M17.12    Primary osteoarthritis of right knee M17.11    Numbness R20.0    Cerebrovascular accident (CVA) (HCC) I63.9    Weak R53.1    UTI (urinary tract infection) with pyuria N39.0    Fall at nursing home [address] [place], Y92.129    Elevated troponin level R74.8    Diabetes mellitus ([facility] E11.9    Class 3 obesity with body mass index (BMI) of 45.0 to 49.9 in adult [id number]    Staghorn renal calculus N20.0    Staghorn calculus N20.0    GERD (gastroesophageal reflux disease) K21.9    Cerebral infarction (HCC) I63.9    Epileptic seizure (HCC) G40.909    Functional diarrhea K59.1    Gastroesophageal reflux disease without esophagitis K21.9    History of percutaneous coronary intervention Z98.61    Muscle weakness M62.81    Secondary diabetes mellitus (HCC) E19.4    Metabolic encephalopathy V98.40    Acute metabolic encephalopathy F42.18    Encephalopathy G93.40         Hospital Course: these medications    acetaminophen 325 MG tablet  Commonly known as:  TYLENOL  Take 2 tablets by mouth every 4 hours as needed for Pain  What changed:    · how much to take  · when to take this        CONTINUE taking these medications    aspirin 81 MG chewable tablet  Take 4 tablets by mouth daily     atorvastatin 80 MG tablet  Commonly known as:  LIPITOR  Take 1 tablet by mouth nightly     b complex vitamins capsule     cyclobenzaprine 10 MG tablet  Commonly known as:  FLEXERIL     docusate 100 MG Caps  Commonly known as:  COLACE, DULCOLAX  Take 100 mg by mouth 2 times daily     FLUoxetine 20 MG capsule  Commonly known as:  PROZAC  Take 1 capsule by mouth daily     folic acid-pyridoxine-cyancobalamin 1.13-25-2 MG Tabs  Take 1 tablet by mouth daily     furosemide 40 MG tablet  Commonly known as:  LASIX  Take 1 tablet by mouth daily     insulin glargine 100 UNIT/ML injection vial  Commonly known as:  LANTUS  Inject 10 Units into the skin nightly     insulin lispro 100 UNIT/ML injection vial  Commonly known as:  HUMALOG     lisinopril 10 MG tablet  Commonly known as:  PRINIVIL;ZESTRIL     LORazepam 2 MG/ML injection  Commonly known as:  ATIVAN     magnesium oxide 400 MG tablet  Commonly known as:  MAG-OX  Take 1 tablet by mouth 2 times daily     metFORMIN 1000 MG tablet  Commonly known as:  GLUCOPHAGE  TAKE 1 TABLET BY MOUTH 2 TIMES DAILY (WITH MEALS)     metoprolol succinate 25 MG extended release tablet  Commonly known as:  TOPROL XL     nitroGLYCERIN 0.4 MG SL tablet  Commonly known as:  NITROSTAT  Place 1 tablet under the tongue every 5 minutes as needed for Chest pain.      NOVOLOG 100 UNIT/ML injection vial  Generic drug:  insulin aspart     oxybutynin 5 MG tablet  Commonly known as:  DITROPAN     PLAVIX 75 MG tablet  Generic drug:  clopidogrel     potassium chloride 10 MEQ extended release tablet  Commonly known as:  KLOR-CON M  Take 1 tablet by mouth 2 times daily     ranitidine 150 MG capsule  Commonly known

## 2019-08-26 NOTE — PLAN OF CARE
refrain from responding to false sensory perceptions  Outcome: Met This Shift  Goal: Demonstrates accurate environmental perceptions  Description  Demonstrates accurate environmental perceptions  Outcome: Met This Shift  Goal: Able to distinguish between reality-based and nonreality-based thinking  Description  Able to distinguish between reality-based and nonreality-based thinking  Outcome: Met This Shift  Goal: Able to interrupt nonreality-based thinking  Description  Able to interrupt nonreality-based thinking  Outcome: Met This Shift     Problem: Sleep Pattern Disturbance:  Goal: Appears well-rested  Description  Appears well-rested  Outcome: Met This Shift     Problem: Falls - Risk of:  Goal: Absence of physical injury  Description  Absence of physical injury  Outcome: Met This Shift  Goal: Will remain free from falls  Description  Will remain free from falls  Outcome: Met This Shift     Problem: Risk for Impaired Skin Integrity  Goal: Tissue integrity - skin and mucous membranes  Description  Structural intactness and normal physiological function of skin and  mucous membranes.   Outcome: Met This Shift

## 2019-08-27 LAB
CULTURE: NORMAL
CULTURE: NORMAL
EKG ATRIAL RATE: 86 BPM
EKG DIAGNOSIS: NORMAL
EKG P AXIS: 55 DEGREES
EKG P-R INTERVAL: 168 MS
EKG Q-T INTERVAL: 408 MS
EKG QRS DURATION: 114 MS
EKG QTC CALCULATION (BAZETT): 488 MS
EKG R AXIS: -61 DEGREES
EKG T AXIS: 85 DEGREES
EKG VENTRICULAR RATE: 86 BPM
Lab: NORMAL
Lab: NORMAL
SPECIMEN: NORMAL
SPECIMEN: NORMAL

## 2019-08-27 NOTE — PROGRESS NOTES
Progress Note( Dr. Felicitas Bearden)  8/26/2019  Subjective:   Admit Date: 8/22/2019  PCP: Dipesh Salguero MD    Admitted For :  Was Admitted for : Altered mental state was brought in from nursing home    Consulted For: Hypothyroidism // also has DM     Interval History: No major change     Denies any chest pains,   Denies SOB . Denies nausea or vomiting. No new bowel or bladder symptoms. Intake/Output Summary (Last 24 hours) at 8/26/2019 2346  Last data filed at 8/26/2019 0926  Gross per 24 hour   Intake 120 ml   Output --   Net 120 ml       DATA    CBC:   Recent Labs     08/24/19  0413 08/25/19  0347 08/26/19  0333   WBC 7.5 7.7 7.4   HGB 9.6* 10.3* 10.1*    286 306    CMP:  Recent Labs     08/24/19  0413 08/25/19  0347 08/26/19  0333    142 140   K 4.0 4.1 3.9    101 100   CO2 26 28 27   BUN 16 12 12   CREATININE 0.8* 0.7* 0.7*   CALCIUM 8.9 9.6 9.3     Lipids:   Lab Results   Component Value Date    CHOL 202 11/27/2015    HDL 51 11/27/2015    TRIG 154 11/27/2015     Glucose:  Recent Labs     08/25/19  2042 08/26/19  0808 08/26/19  1201   POCGLU 143* 116* 185*     [id number]:  Lab Results   Component Value Date    LABA1C 5.7 08/23/2019     High Sensitivity TSH:   Lab Results   Component Value Date    TSHHS 2.960 08/24/2019     Free T3: No results found for: FT3  Free T4:  Lab Results   Component Value Date    T4FREE 0.84 08/24/2019       Ct Abdomen Pelvis Wo Contrast    Result Date: 8/22/2019  EXAMINATION: CT OF THE ABDOMEN AND PELVIS WITHOUT CONTRAST 8/22/2019 5:21 am     No CT evidence of acute injury in the abdomen or pelvis given motion and noise artifact. No obstructive uropathy. Redemonstration of multiple nonobstructing right kidney stones including a large staghorn type calculus. No obvious acute fracture.      Ct Head Wo Contrast    Result Date: 8/22/2019  EXAMINATION: CT OF THE HEAD WITHOUT CONTRAST  8/22/2019 5:22 am T  ce of acute fracture or malalignment of the right 7. Will follow     .      Rickey Kawasaki, MD

## 2019-08-27 NOTE — PROGRESS NOTES
NEUROLOGY NOTE  DR. Guerline Reagan MD.  -------------------------------------------------  Subjective:    Pt denies any new symptoms    Pt is more awake    Pt follows simple commands    Moves extremities    Objective:    BP (!) 143/88   Pulse 90   Temp 99.3 °F (37.4 °C) (Axillary)   Resp 19   Ht 5' 6\" (1.676 m)   Wt (!) 348 lb 12.3 oz (158.2 kg)   SpO2 92%   BMI 56.29 kg/m²   HEENT nl      Neuro exam    Alert Oriented  X 2  Follow simple commands  Old dysarthria  EOMI Pupils 3 mm mian  Moves extremities      RADIOLOGY  -----------------    Ct Abdomen Pelvis Wo Contrast    Result Date: 8/22/2019  EXAMINATION: CT OF THE ABDOMEN AND PELVIS WITHOUT CONTRAST 8/22/2019 5:21 am TECHNIQUE: CT of the abdomen and pelvis was performed without the administration of intravenous contrast. Multiplanar reformatted images are provided for review. Dose modulation, iterative reconstruction, and/or weight based adjustment of the mA/kV was utilized to reduce the radiation dose to as low as reasonably achievable. COMPARISON: May 19, 2019. HISTORY: ORDERING SYSTEM PROVIDED HISTORY: fall, hip pain, morbidly obese TECHNOLOGIST PROVIDED HISTORY: Reason for Exam: fall. Extremely limited due to pt circumfrance he was getting stuck in the scanner FINDINGS: Suboptimal evaluation of the solid abdominal organs and vasculature due to lack of intravenous contrast. Partial exclusion of the left lateral abdomen. Image quality is degraded by motion and noise artifact. Lower Chest: Please refer to concurrent chest CT report for intrathoracic findings. Liver: Normal. Gallbladder and Bile Ducts: Normal. Spleen: Normal. Adrenal Glands: Normal. Pancreas: Normal. Genitourinary: Redemonstration of large staghorn type calculus in the right kidney with multiple additional nonobstructing stones in the right kidney. Normal left kidney. No obstructive uropathy. Both ureters are normal in course and caliber. The urinary bladder is unremarkable. and left precentral gyrus, increased in size compared to the previous study. Small chronic cortical and subcortical infarct is noted in the posterior right frontal lobe. Chronic infarct is noted in the posterior right corpus striatum. There are also several chronic infarcts noted in the left basal ganglia. There are scattered and confluent areas of increased T2 signal noted supratentorially compatible with moderate-severe chronic microvascular white matter ischemic disease. No abnormal extra-axial fluid collections. The ventricles are normal in size. Normal major intracranial flow voids are noted. There are no areas of blooming artifact noted on the gradient echo sequences to suggest sequela of acute or chronic hemorrhage. ORBITS: There is a left lens implant from cataract surgery. The right orbit is unremarkable. SINUSES: There are bilateral maxillary mucous retention cysts. There is scattered trace paranasal sinus disease. There is a trace left mastoid effusion. The right mastoid air cells are clear. BONES/SOFT TISSUES: The bone marrow signal intensity appears normal. The soft tissues demonstrate no acute abnormality. 1. No evidence of an acute infarct. 2. Cerebral parenchymal volume loss with chronic microvascular white matter ischemic disease. 3. Chronic infarcts noted in the frontal lobes and basal ganglia.        LAB RESULTS  --------------------    Recent Results (from the past 24 hour(s))   CBC Auto Differential    Collection Time: 08/26/19  3:33 AM   Result Value Ref Range    WBC 7.4 4.0 - 10.5 K/CU MM    RBC 3.90 (L) 4.6 - 6.2 M/CU MM    Hemoglobin 10.1 (L) 13.5 - 18.0 GM/DL    Hematocrit 32.9 (L) 42 - 52 %    MCV 84.4 78 - 100 FL    MCH 25.9 (L) 27 - 31 PG    MCHC 30.7 (L) 32.0 - 36.0 %    RDW 16.5 (H) 11.7 - 14.9 %    Platelets 820 565 - 213 K/CU MM    MPV 8.0 7.5 - 11.1 FL    Differential Type AUTOMATED DIFFERENTIAL     Segs Relative 57.8 36 - 66 %    Lymphocytes % 27.2 24 - 44 % 49.9 in adult     Staghorn renal calculus     Staghorn calculus     GERD (gastroesophageal reflux disease)     Cerebral infarction (HCC)     Epileptic seizure ([facility]     Functional diarrhea     Gastroesophageal reflux disease without esophagitis     History of percutaneous coronary intervention     Muscle weakness     Secondary diabetes mellitus ([facility] [facility]     Metabolic encephalopathy     Acute metabolic encephalopathy     Encephalopathy      ASSESSMENT:  ---------------------    Hypoxic encephalopathy     Metabolic encephalopathy     Hx CVA     C 5-6  6-7 moderate to severe spondylosis     PLAN:     CT brain neg     Mri brain old cerebral infarcts     Continue plavix     Continue dilantin     Pt is stable neurologically.         Electronically signed by Nathaniel Cox MD on 8/26/2019 at 10:24 PM

## 2019-09-02 ENCOUNTER — HOSPITAL ENCOUNTER (INPATIENT)
Age: 54
LOS: 2 days | Discharge: NURSING, REHAB OR LONG-TERM CARE | DRG: 053 | End: 2019-09-04
Attending: EMERGENCY MEDICINE | Admitting: HOSPITALIST
Payer: MEDICAID

## 2019-09-02 DIAGNOSIS — R56.9 SEIZURE SECONDARY TO SUBTHERAPEUTIC ANTICONVULSANT MEDICATION (HCC): Primary | ICD-10-CM

## 2019-09-02 DIAGNOSIS — Z79.899 SEIZURE SECONDARY TO SUBTHERAPEUTIC ANTICONVULSANT MEDICATION (HCC): Primary | ICD-10-CM

## 2019-09-02 LAB
ALBUMIN SERPL-MCNC: 3.4 GM/DL (ref 3.4–5)
ALP BLD-CCNC: 141 IU/L (ref 40–129)
ALT SERPL-CCNC: 25 U/L (ref 10–40)
ANION GAP SERPL CALCULATED.3IONS-SCNC: 13 MMOL/L (ref 4–16)
AST SERPL-CCNC: 28 IU/L (ref 15–37)
BASE EXCESS MIXED: ABNORMAL (ref 0–1.2)
BASOPHILS ABSOLUTE: 0 K/CU MM
BASOPHILS RELATIVE PERCENT: 0.4 % (ref 0–1)
BILIRUB SERPL-MCNC: 0.2 MG/DL (ref 0–1)
BUN BLDV-MCNC: 23 MG/DL (ref 6–23)
CALCIUM SERPL-MCNC: 9.3 MG/DL (ref 8.3–10.6)
CARBON MONOXIDE, BLOOD: 1.8 % (ref 0–5)
CHLORIDE BLD-SCNC: 99 MMOL/L (ref 99–110)
CO2 CONTENT: 28.6 MMOL/L (ref 19–24)
CO2: 21 MMOL/L (ref 21–32)
CREAT SERPL-MCNC: 0.9 MG/DL (ref 0.9–1.3)
DIFFERENTIAL TYPE: ABNORMAL
DOSE AMOUNT: ABNORMAL
DOSE TIME: ABNORMAL
EOSINOPHILS ABSOLUTE: 0.3 K/CU MM
EOSINOPHILS RELATIVE PERCENT: 3.1 % (ref 0–3)
GFR AFRICAN AMERICAN: >60 ML/MIN/1.73M2
GFR NON-AFRICAN AMERICAN: >60 ML/MIN/1.73M2
GLUCOSE BLD-MCNC: 110 MG/DL (ref 70–99)
GLUCOSE BLD-MCNC: 71 MG/DL (ref 70–99)
GLUCOSE BLD-MCNC: 85 MG/DL (ref 70–99)
GLUCOSE BLD-MCNC: 88 MG/DL (ref 70–99)
HCO3 ARTERIAL: 27.2 MMOL/L (ref 18–23)
HCT VFR BLD CALC: 34.9 % (ref 42–52)
HEMOGLOBIN: 10.9 GM/DL (ref 13.5–18)
IMMATURE NEUTROPHIL %: 0.3 % (ref 0–0.43)
LYMPHOCYTES ABSOLUTE: 2.7 K/CU MM
LYMPHOCYTES RELATIVE PERCENT: 28.9 % (ref 24–44)
MCH RBC QN AUTO: 26.1 PG (ref 27–31)
MCHC RBC AUTO-ENTMCNC: 31.2 % (ref 32–36)
MCV RBC AUTO: 83.5 FL (ref 78–100)
METHEMOGLOBIN ARTERIAL: 1.7 %
MONOCYTES ABSOLUTE: 0.7 K/CU MM
MONOCYTES RELATIVE PERCENT: 7.2 % (ref 0–4)
NUCLEATED RBC %: 0 %
O2 SATURATION: 92.2 % (ref 96–97)
PCO2 ARTERIAL: 45 MMHG (ref 32–45)
PDW BLD-RTO: 16.4 % (ref 11.7–14.9)
PH BLOOD: 7.39 (ref 7.34–7.45)
PHENYTOIN LEVEL: 3.2 UG/ML (ref 10–20)
PLATELET # BLD: 451 K/CU MM (ref 140–440)
PMV BLD AUTO: 8.3 FL (ref 7.5–11.1)
PO2 ARTERIAL: 70 MMHG (ref 75–100)
POTASSIUM SERPL-SCNC: 3.9 MMOL/L (ref 3.5–5.1)
RBC # BLD: 4.18 M/CU MM (ref 4.6–6.2)
SEGMENTED NEUTROPHILS ABSOLUTE COUNT: 5.5 K/CU MM
SEGMENTED NEUTROPHILS RELATIVE PERCENT: 60.1 % (ref 36–66)
SODIUM BLD-SCNC: 133 MMOL/L (ref 135–145)
TOTAL IMMATURE NEUTOROPHIL: 0.03 K/CU MM
TOTAL NUCLEATED RBC: 0 K/CU MM
TOTAL PROTEIN: 7.4 GM/DL (ref 6.4–8.2)
WBC # BLD: 9.2 K/CU MM (ref 4–10.5)

## 2019-09-02 PROCEDURE — 6360000002 HC RX W HCPCS: Performed by: INTERNAL MEDICINE

## 2019-09-02 PROCEDURE — 1200000000 HC SEMI PRIVATE

## 2019-09-02 PROCEDURE — 96365 THER/PROPH/DIAG IV INF INIT: CPT

## 2019-09-02 PROCEDURE — 96375 TX/PRO/DX INJ NEW DRUG ADDON: CPT

## 2019-09-02 PROCEDURE — 96367 TX/PROPH/DG ADDL SEQ IV INF: CPT

## 2019-09-02 PROCEDURE — 85025 COMPLETE CBC W/AUTO DIFF WBC: CPT

## 2019-09-02 PROCEDURE — 36415 COLL VENOUS BLD VENIPUNCTURE: CPT

## 2019-09-02 PROCEDURE — 99255 IP/OBS CONSLTJ NEW/EST HI 80: CPT | Performed by: PSYCHIATRY & NEUROLOGY

## 2019-09-02 PROCEDURE — 80185 ASSAY OF PHENYTOIN TOTAL: CPT

## 2019-09-02 PROCEDURE — 36600 WITHDRAWAL OF ARTERIAL BLOOD: CPT

## 2019-09-02 PROCEDURE — 99284 EMERGENCY DEPT VISIT MOD MDM: CPT

## 2019-09-02 PROCEDURE — 80053 COMPREHEN METABOLIC PANEL: CPT

## 2019-09-02 PROCEDURE — 2580000003 HC RX 258: Performed by: HOSPITALIST

## 2019-09-02 PROCEDURE — 82803 BLOOD GASES ANY COMBINATION: CPT

## 2019-09-02 PROCEDURE — 2580000003 HC RX 258: Performed by: INTERNAL MEDICINE

## 2019-09-02 PROCEDURE — 82962 GLUCOSE BLOOD TEST: CPT

## 2019-09-02 PROCEDURE — 6360000002 HC RX W HCPCS: Performed by: EMERGENCY MEDICINE

## 2019-09-02 PROCEDURE — 2580000003 HC RX 258: Performed by: EMERGENCY MEDICINE

## 2019-09-02 RX ORDER — TAMSULOSIN HYDROCHLORIDE 0.4 MG/1
0.4 CAPSULE ORAL DAILY
Status: DISCONTINUED | OUTPATIENT
Start: 2019-09-02 | End: 2019-09-04 | Stop reason: HOSPADM

## 2019-09-02 RX ORDER — ONDANSETRON 2 MG/ML
4 INJECTION INTRAMUSCULAR; INTRAVENOUS EVERY 6 HOURS PRN
Status: DISCONTINUED | OUTPATIENT
Start: 2019-09-02 | End: 2019-09-04 | Stop reason: HOSPADM

## 2019-09-02 RX ORDER — CYCLOBENZAPRINE HCL 10 MG
10 TABLET ORAL 3 TIMES DAILY PRN
Status: DISCONTINUED | OUTPATIENT
Start: 2019-09-02 | End: 2019-09-04 | Stop reason: HOSPADM

## 2019-09-02 RX ORDER — NITROGLYCERIN 0.4 MG/1
0.4 TABLET SUBLINGUAL EVERY 5 MIN PRN
Status: DISCONTINUED | OUTPATIENT
Start: 2019-09-02 | End: 2019-09-04 | Stop reason: HOSPADM

## 2019-09-02 RX ORDER — ATORVASTATIN CALCIUM 40 MG/1
80 TABLET, FILM COATED ORAL NIGHTLY
Status: DISCONTINUED | OUTPATIENT
Start: 2019-09-02 | End: 2019-09-04 | Stop reason: HOSPADM

## 2019-09-02 RX ORDER — FAMOTIDINE 20 MG/1
20 TABLET, FILM COATED ORAL 2 TIMES DAILY
Status: DISCONTINUED | OUTPATIENT
Start: 2019-09-02 | End: 2019-09-04 | Stop reason: HOSPADM

## 2019-09-02 RX ORDER — METOPROLOL SUCCINATE 25 MG/1
25 TABLET, EXTENDED RELEASE ORAL DAILY
Status: DISCONTINUED | OUTPATIENT
Start: 2019-09-02 | End: 2019-09-04 | Stop reason: HOSPADM

## 2019-09-02 RX ORDER — OXYBUTYNIN CHLORIDE 5 MG/1
5 TABLET ORAL 3 TIMES DAILY
Status: DISCONTINUED | OUTPATIENT
Start: 2019-09-02 | End: 2019-09-04 | Stop reason: HOSPADM

## 2019-09-02 RX ORDER — CLOPIDOGREL BISULFATE 75 MG/1
75 TABLET ORAL DAILY
Status: DISCONTINUED | OUTPATIENT
Start: 2019-09-02 | End: 2019-09-04 | Stop reason: HOSPADM

## 2019-09-02 RX ORDER — LORAZEPAM 2 MG/ML
2 INJECTION INTRAMUSCULAR ONCE
Status: COMPLETED | OUTPATIENT
Start: 2019-09-02 | End: 2019-09-02

## 2019-09-02 RX ORDER — DOCUSATE SODIUM 100 MG/1
100 CAPSULE, LIQUID FILLED ORAL 2 TIMES DAILY
Status: DISCONTINUED | OUTPATIENT
Start: 2019-09-02 | End: 2019-09-04 | Stop reason: HOSPADM

## 2019-09-02 RX ORDER — SODIUM CHLORIDE 0.9 % (FLUSH) 0.9 %
10 SYRINGE (ML) INJECTION PRN
Status: DISCONTINUED | OUTPATIENT
Start: 2019-09-02 | End: 2019-09-04 | Stop reason: HOSPADM

## 2019-09-02 RX ORDER — LORAZEPAM 2 MG/ML
1 INJECTION INTRAMUSCULAR EVERY 4 HOURS PRN
Status: DISCONTINUED | OUTPATIENT
Start: 2019-09-02 | End: 2019-09-04 | Stop reason: HOSPADM

## 2019-09-02 RX ORDER — RANITIDINE 150 MG/1
150 CAPSULE ORAL 2 TIMES DAILY
Status: DISCONTINUED | OUTPATIENT
Start: 2019-09-02 | End: 2019-09-02

## 2019-09-02 RX ORDER — LEVOTHYROXINE SODIUM 0.05 MG/1
50 TABLET ORAL DAILY
Status: DISCONTINUED | OUTPATIENT
Start: 2019-09-02 | End: 2019-09-04 | Stop reason: HOSPADM

## 2019-09-02 RX ORDER — DEXTROSE AND SODIUM CHLORIDE 5; .45 G/100ML; G/100ML
INJECTION, SOLUTION INTRAVENOUS CONTINUOUS
Status: DISCONTINUED | OUTPATIENT
Start: 2019-09-02 | End: 2019-09-03

## 2019-09-02 RX ORDER — DEXTROSE MONOHYDRATE 50 MG/ML
100 INJECTION, SOLUTION INTRAVENOUS PRN
Status: DISCONTINUED | OUTPATIENT
Start: 2019-09-02 | End: 2019-09-04 | Stop reason: HOSPADM

## 2019-09-02 RX ORDER — B12/LEVOMEFOLATE CALCIUM/B-6 2-1.13-25
1 TABLET ORAL DAILY
Status: DISCONTINUED | OUTPATIENT
Start: 2019-09-02 | End: 2019-09-04 | Stop reason: HOSPADM

## 2019-09-02 RX ORDER — NICOTINE POLACRILEX 4 MG
15 LOZENGE BUCCAL PRN
Status: DISCONTINUED | OUTPATIENT
Start: 2019-09-02 | End: 2019-09-04 | Stop reason: HOSPADM

## 2019-09-02 RX ORDER — ASPIRIN 81 MG/1
324 TABLET, CHEWABLE ORAL DAILY
Status: DISCONTINUED | OUTPATIENT
Start: 2019-09-02 | End: 2019-09-04 | Stop reason: HOSPADM

## 2019-09-02 RX ORDER — DEXTROSE MONOHYDRATE 25 G/50ML
12.5 INJECTION, SOLUTION INTRAVENOUS PRN
Status: DISCONTINUED | OUTPATIENT
Start: 2019-09-02 | End: 2019-09-04 | Stop reason: HOSPADM

## 2019-09-02 RX ORDER — SODIUM CHLORIDE 0.9 % (FLUSH) 0.9 %
10 SYRINGE (ML) INJECTION EVERY 12 HOURS SCHEDULED
Status: DISCONTINUED | OUTPATIENT
Start: 2019-09-02 | End: 2019-09-04 | Stop reason: HOSPADM

## 2019-09-02 RX ORDER — POTASSIUM CHLORIDE 750 MG/1
10 TABLET, FILM COATED, EXTENDED RELEASE ORAL 2 TIMES DAILY
Status: DISCONTINUED | OUTPATIENT
Start: 2019-09-02 | End: 2019-09-04 | Stop reason: HOSPADM

## 2019-09-02 RX ORDER — ACETAMINOPHEN 325 MG/1
650 TABLET ORAL EVERY 4 HOURS PRN
Status: DISCONTINUED | OUTPATIENT
Start: 2019-09-02 | End: 2019-09-04 | Stop reason: HOSPADM

## 2019-09-02 RX ORDER — PHENYTOIN 50 MG/1
200 TABLET, CHEWABLE ORAL 3 TIMES DAILY
Status: DISCONTINUED | OUTPATIENT
Start: 2019-09-02 | End: 2019-09-02

## 2019-09-02 RX ORDER — FLUOXETINE HYDROCHLORIDE 20 MG/1
20 CAPSULE ORAL DAILY
Status: DISCONTINUED | OUTPATIENT
Start: 2019-09-02 | End: 2019-09-04 | Stop reason: HOSPADM

## 2019-09-02 RX ORDER — LISINOPRIL 10 MG/1
10 TABLET ORAL DAILY
Status: DISCONTINUED | OUTPATIENT
Start: 2019-09-02 | End: 2019-09-04 | Stop reason: HOSPADM

## 2019-09-02 RX ORDER — FUROSEMIDE 40 MG/1
40 TABLET ORAL DAILY
Status: DISCONTINUED | OUTPATIENT
Start: 2019-09-02 | End: 2019-09-04

## 2019-09-02 RX ORDER — SODIUM CHLORIDE 9 MG/ML
INJECTION, SOLUTION INTRAVENOUS CONTINUOUS
Status: DISCONTINUED | OUTPATIENT
Start: 2019-09-02 | End: 2019-09-02

## 2019-09-02 RX ORDER — INSULIN GLARGINE 100 [IU]/ML
10 INJECTION, SOLUTION SUBCUTANEOUS NIGHTLY
Status: DISCONTINUED | OUTPATIENT
Start: 2019-09-02 | End: 2019-09-04 | Stop reason: HOSPADM

## 2019-09-02 RX ADMIN — LORAZEPAM 2 MG: 2 INJECTION INTRAMUSCULAR; INTRAVENOUS at 00:56

## 2019-09-02 RX ADMIN — LEVETIRACETAM 1000 MG: 100 INJECTION, SOLUTION INTRAVENOUS at 23:21

## 2019-09-02 RX ADMIN — DEXTROSE AND SODIUM CHLORIDE: 5; 450 INJECTION, SOLUTION INTRAVENOUS at 18:11

## 2019-09-02 RX ADMIN — LEVETIRACETAM 2000 MG: 100 INJECTION, SOLUTION INTRAVENOUS at 01:30

## 2019-09-02 RX ADMIN — LEVETIRACETAM 1000 MG: 100 INJECTION, SOLUTION INTRAVENOUS at 11:59

## 2019-09-02 RX ADMIN — SODIUM CHLORIDE: 9 INJECTION, SOLUTION INTRAVENOUS at 04:34

## 2019-09-02 ASSESSMENT — PAIN SCALES - GENERAL
PAINLEVEL_OUTOF10: 0
PAINLEVEL_OUTOF10: 0

## 2019-09-02 NOTE — ED PROVIDER NOTES
Triage Chief Complaint:   Seizures    [place]:  Fernanda Lainez is a 48 y.o. male that presents from nursing facility for seizures. The patient has a history of epilepsy and is on Dilantin. He states that he has not missed any doses of his medication. States that over the past day he has been having frequent right upper extremity seizures consistent with his history. Denies any headache, vision changes, new motor or sensory deficits, chest pain, shortness of breath, fever, cough, abdominal pain. Denies any recent changes in medications. He is unclear about how often he usually has his focal seizures of his right upper extremity. Patient was not given any medication prior to arrival for antiepileptic therapy. ROS:  At least 14 systems reviewed and otherwise acutely negative except as in the [address] Past Medical History:   Diagnosis Date    CAD (coronary artery disease) 3/19/2014    Diabetes mellitus ([address]     New diagnosis 3/19/14    Family history of early CAD     Father-age 42's    H/O cardiovascular stress test 5/9/2014    EF 62% normal study    H/O echocardiogram 7/28/15    EF 55-60%. Mild mitral and tricuspid insuff.  H/O echocardiogram 05/09/2018    EF40-50% mild phtn, , TR    History of cardiac catheterization 3/19/2014    3/2014-LAD 99% prox,LAD 80% mid,CX dominant with OM1 50% prox, PDA 80%, RCA 80% mid-PTCA with 1 stent to LAD prox and 2 stents to LAD mid. CX stent in 2nd sitting as OP-Dr Wen Murphy    History of echocardiogram 3/19/2014    3/2014-Mild LVH. Normal global and regional LVSF. EF 60%.  Mild MR/TR;    Hx of Doppler ultrasound 5/09/14    Peripheral- Normal    Hyperlipemia     Loss of consciousness ([address] 3/18/2014    Multiple lacunar infarcts (HCC)     chronic-basal ganglia region bilaterally    NSTEMI (non-ST elevated myocardial infarction) ([address] 3/19/2014    S/P primary angioplasty with coronary stent 3/19/2014    3/2014-PTCA with 3 stents to LAD;    Seizures (HCC)      Past Surgical History:   Procedure Laterality Date    CORONARY ANGIOPLASTY WITH STENT PLACEMENT  3/19/2014    3/2014-PTCA with 1 stent to LAD prox and 2 stents to LAD mid- Dr Priscilla Lau CATH LAB PROCEDURE  3/19/2014    3 stents placed in LAD    INSERTABLE CARDIAC MONITOR Left 11/16/2017    [employer] Reveal LINQ     OTHER SURGICAL HISTORY Right 10/23/15    groin I&D    PTCA  04/07/2014    PTCA and stent of CX     Family History   Problem Relation Age of Onset    Heart Disease Mother     High Blood Pressure Mother     Heart Disease Father     High Blood Pressure Father     High Blood Pressure Brother     High Blood Pressure Brother     High Blood Pressure Brother      Social History     Socioeconomic History    Marital status: Single     Spouse name: Not on file    Number of children: Not on file    Years of education: Not on file    Highest education level: Not on file   Occupational History    Not on file   Social Needs    Financial resource strain: Not on file    Food insecurity:     Worry: Not on file     Inability: Not on file    Transportation needs:     Medical: Not on file     Non-medical: Not on file   Tobacco Use    Smoking status: Never Smoker    Smokeless tobacco: Current User     Types: Chew    Tobacco comment: rev 8/24/2016.   snuff   Substance and Sexual Activity    Alcohol use: No     Alcohol/week: 0.0 standard drinks     Comment: decaf tea    Drug use: No    Sexual activity: Not on file   Lifestyle    Physical activity:     Days per week: Not on file     Minutes per session: Not on file    Stress: Not on file   Relationships    Social connections:     Talks on phone: Not on file     Gets together: Not on file     Attends [employer] service: Not on file     Active member of club or organization: Not on file     Attends meetings of clubs or organizations: Not on file     Relationship status: Not on file    Intimate partner violence:     Fear of current or ex African American >60 >60 mL/min/1.73m2    Anion Gap 13 4 - 16   Phenytoin Level, total   Result Value Ref Range    Phenytoin Lvl 3.2 (L) 10 - 20 UG/ML    DOSE AMOUNT DOSE AMT. GIVEN - UNKNOWN     DOSE TIME DOSE TIME GIVEN - UNKNOWN       Radiographs (if obtained):  [] The following radiograph was interpreted by myself in the absence of a radiologist:  [] Radiologist's Report Reviewed:    EKG (if obtained): (All EKG's are interpreted by myself in the absence of a cardiologist)    MDM:  Plan of care is discussed thoroughly with the patient and family if present. If performed, all imaging and lab work also discussed with patient. All relevant prior results and chart reviewed if available. On arrival, the patient is having refractory focal seizures that involve his right upper extremity and some gaze deviation. In between these episodes, the patient is otherwise alert and oriented with no acute complaints. His vital signs are normal.  No reported trauma. He otherwise does not have any new focal neurologic deficits. Patient was given 2 mg of Ativan and loaded with Keppra. This did stop his seizure-like activity. Metabolic work-up is largely unremarkable. Patient has a subtherapeutic phenytoin level and is loaded with fosphenytoin here. He is admitted for further management and observation. Patient is agreeable with this plan of care. Clinical Impression:  1.  Seizure secondary to subtherapeutic anticonvulsant medication ([address]      (Please note that portions of this note may have been completed with a voice recognition program. Efforts were made to edit the dictations but occasionally words are mis-transcribed.)    MD Az Carrero MD  09/02/19 4312

## 2019-09-02 NOTE — ED TRIAGE NOTES
Sent from [facility] for seizure,patient has a hx of seizures,patient having intermittent right arm twitching

## 2019-09-02 NOTE — H&P
regional LVSF. EF 60%. Mild MR/TR;    Hx of Doppler ultrasound 5/09/14    Peripheral- Normal    Hyperlipemia     Loss of consciousness ([facility] 3/18/2014    Multiple lacunar infarcts (HCC)     chronic-basal ganglia region bilaterally    NSTEMI (non-ST elevated myocardial infarction) ([facility] 3/19/2014    S/P primary angioplasty with coronary stent 3/19/2014    3/2014-PTCA with 3 stents to LAD;    Seizures [facility])        Past Surgical History:        Procedure Laterality Date    CORONARY ANGIOPLASTY WITH STENT PLACEMENT  3/19/2014    3/2014-PTCA with 1 stent to LAD prox and 2 stents to LAD mid- Dr Stepan Alfaro CATH LAB PROCEDURE  3/19/2014    3 stents placed in LAD    INSERTABLE CARDIAC MONITOR Left 11/16/2017    [employer] LINQ     OTHER SURGICAL HISTORY Right 10/23/15    groin I&D    PTCA  04/07/2014    PTCA and stent of CX       MedicationsPrior to Admission:    Prior to Admission medications    Medication Sig Start Date End Date Taking? Authorizing Provider   phenytoin (DILANTIN) 50 MG tablet Take 4 tablets by mouth 3 times daily 8/26/19   Wayne Shone, MD   levothyroxine (SYNTHROID) 50 MCG tablet Take 1 tablet by mouth Daily 8/27/19   Wayne Shone, MD   furosemide (LASIX) 40 MG tablet Take 1 tablet by mouth daily 5/23/19   Venu Cali MD   magnesium oxide (MAG-OX) 400 MG tablet Take 1 tablet by mouth 2 times daily 5/23/19   Venu Cali MD   potassium chloride (KLOR-CON M) 10 MEQ extended release tablet Take 1 tablet by mouth 2 times daily 5/23/19   Venu Cali MD   LORazepam (ATIVAN) 2 MG/ML injection Inject 1 mg into the muscle every 4 hours. .    Historical Provider, MD   insulin lispro (HUMALOG) 100 UNIT/ML injection vial Inject into the skin 2 times daily (before meals)    Historical Provider, MD   b complex vitamins capsule Take 1 capsule by mouth daily    Historical Provider, MD   clopidogrel (PLAVIX) 75 MG tablet Take 75 mg by mouth daily    Historical

## 2019-09-03 LAB
ALBUMIN SERPL-MCNC: 3.6 GM/DL (ref 3.4–5)
ALP BLD-CCNC: 172 IU/L (ref 40–128)
ALT SERPL-CCNC: 27 U/L (ref 10–40)
ANION GAP SERPL CALCULATED.3IONS-SCNC: 13 MMOL/L (ref 4–16)
AST SERPL-CCNC: 28 IU/L (ref 15–37)
BASOPHILS ABSOLUTE: 0 K/CU MM
BASOPHILS RELATIVE PERCENT: 0.5 % (ref 0–1)
BILIRUB SERPL-MCNC: 0.2 MG/DL (ref 0–1)
BUN BLDV-MCNC: 13 MG/DL (ref 6–23)
CALCIUM SERPL-MCNC: 9.4 MG/DL (ref 8.3–10.6)
CHLORIDE BLD-SCNC: 103 MMOL/L (ref 99–110)
CO2: 25 MMOL/L (ref 21–32)
CREAT SERPL-MCNC: 0.7 MG/DL (ref 0.9–1.3)
DIFFERENTIAL TYPE: ABNORMAL
EOSINOPHILS ABSOLUTE: 0.4 K/CU MM
EOSINOPHILS RELATIVE PERCENT: 4.6 % (ref 0–3)
GFR AFRICAN AMERICAN: >60 ML/MIN/1.73M2
GFR NON-AFRICAN AMERICAN: >60 ML/MIN/1.73M2
GLUCOSE BLD-MCNC: 105 MG/DL (ref 70–99)
GLUCOSE BLD-MCNC: 117 MG/DL (ref 70–99)
GLUCOSE BLD-MCNC: 174 MG/DL (ref 70–99)
GLUCOSE BLD-MCNC: 85 MG/DL (ref 70–99)
HCT VFR BLD CALC: 36.6 % (ref 42–52)
HEMOGLOBIN: 11.3 GM/DL (ref 13.5–18)
IMMATURE NEUTROPHIL %: 0.5 % (ref 0–0.43)
LYMPHOCYTES ABSOLUTE: 2.7 K/CU MM
LYMPHOCYTES RELATIVE PERCENT: 32.3 % (ref 24–44)
MCH RBC QN AUTO: 26.2 PG (ref 27–31)
MCHC RBC AUTO-ENTMCNC: 30.9 % (ref 32–36)
MCV RBC AUTO: 84.7 FL (ref 78–100)
MONOCYTES ABSOLUTE: 0.5 K/CU MM
MONOCYTES RELATIVE PERCENT: 5.9 % (ref 0–4)
NUCLEATED RBC %: 0 %
PDW BLD-RTO: 16.3 % (ref 11.7–14.9)
PLATELET # BLD: 454 K/CU MM (ref 140–440)
PMV BLD AUTO: 8.6 FL (ref 7.5–11.1)
POTASSIUM SERPL-SCNC: 4.4 MMOL/L (ref 3.5–5.1)
RBC # BLD: 4.32 M/CU MM (ref 4.6–6.2)
SEGMENTED NEUTROPHILS ABSOLUTE COUNT: 4.8 K/CU MM
SEGMENTED NEUTROPHILS RELATIVE PERCENT: 56.2 % (ref 36–66)
SODIUM BLD-SCNC: 141 MMOL/L (ref 135–145)
TOTAL IMMATURE NEUTOROPHIL: 0.04 K/CU MM
TOTAL NUCLEATED RBC: 0 K/CU MM
TOTAL PROTEIN: 7 GM/DL (ref 6.4–8.2)
WBC # BLD: 8.5 K/CU MM (ref 4–10.5)

## 2019-09-03 PROCEDURE — 2580000003 HC RX 258: Performed by: HOSPITALIST

## 2019-09-03 PROCEDURE — 6360000002 HC RX W HCPCS: Performed by: HOSPITALIST

## 2019-09-03 PROCEDURE — 2580000003 HC RX 258: Performed by: CLINICAL NURSE SPECIALIST

## 2019-09-03 PROCEDURE — 6360000002 HC RX W HCPCS: Performed by: CLINICAL NURSE SPECIALIST

## 2019-09-03 PROCEDURE — 6370000000 HC RX 637 (ALT 250 FOR IP): Performed by: HOSPITALIST

## 2019-09-03 PROCEDURE — 1200000000 HC SEMI PRIVATE

## 2019-09-03 PROCEDURE — 6360000002 HC RX W HCPCS: Performed by: INTERNAL MEDICINE

## 2019-09-03 PROCEDURE — 2580000003 HC RX 258: Performed by: INTERNAL MEDICINE

## 2019-09-03 PROCEDURE — 85025 COMPLETE CBC W/AUTO DIFF WBC: CPT

## 2019-09-03 PROCEDURE — 36415 COLL VENOUS BLD VENIPUNCTURE: CPT

## 2019-09-03 PROCEDURE — 82962 GLUCOSE BLOOD TEST: CPT

## 2019-09-03 PROCEDURE — 80053 COMPREHEN METABOLIC PANEL: CPT

## 2019-09-03 RX ADMIN — LEVOTHYROXINE SODIUM 50 MCG: 50 TABLET ORAL at 14:45

## 2019-09-03 RX ADMIN — LEVETIRACETAM 1000 MG: 100 INJECTION, SOLUTION INTRAVENOUS at 11:27

## 2019-09-03 RX ADMIN — Medication 10 ML: at 21:31

## 2019-09-03 RX ADMIN — DOCUSATE SODIUM 100 MG: 100 CAPSULE, LIQUID FILLED ORAL at 21:30

## 2019-09-03 RX ADMIN — Medication 1 TABLET: at 14:23

## 2019-09-03 RX ADMIN — FAMOTIDINE 20 MG: 20 TABLET ORAL at 21:30

## 2019-09-03 RX ADMIN — FLUOXETINE 20 MG: 20 CAPSULE ORAL at 14:25

## 2019-09-03 RX ADMIN — INSULIN GLARGINE 10 UNITS: 100 INJECTION, SOLUTION SUBCUTANEOUS at 21:30

## 2019-09-03 RX ADMIN — ENOXAPARIN SODIUM 40 MG: 40 INJECTION SUBCUTANEOUS at 10:37

## 2019-09-03 RX ADMIN — OXYBUTYNIN CHLORIDE 5 MG: 5 TABLET ORAL at 21:31

## 2019-09-03 RX ADMIN — Medication 400 MG: at 14:25

## 2019-09-03 RX ADMIN — TAMSULOSIN HYDROCHLORIDE 0.4 MG: 0.4 CAPSULE ORAL at 14:23

## 2019-09-03 RX ADMIN — DOCUSATE SODIUM 100 MG: 100 CAPSULE, LIQUID FILLED ORAL at 14:24

## 2019-09-03 RX ADMIN — POTASSIUM CHLORIDE 10 MEQ: 750 TABLET, FILM COATED, EXTENDED RELEASE ORAL at 21:30

## 2019-09-03 RX ADMIN — FUROSEMIDE 40 MG: 40 TABLET ORAL at 14:25

## 2019-09-03 RX ADMIN — Medication 400 MG: at 21:30

## 2019-09-03 RX ADMIN — OXYBUTYNIN CHLORIDE 5 MG: 5 TABLET ORAL at 14:24

## 2019-09-03 RX ADMIN — FAMOTIDINE 20 MG: 20 TABLET ORAL at 14:24

## 2019-09-03 RX ADMIN — LISINOPRIL 10 MG: 10 TABLET ORAL at 14:24

## 2019-09-03 RX ADMIN — ATORVASTATIN CALCIUM 80 MG: 40 TABLET, FILM COATED ORAL at 21:31

## 2019-09-03 RX ADMIN — METOPROLOL SUCCINATE 25 MG: 25 TABLET, EXTENDED RELEASE ORAL at 14:26

## 2019-09-03 RX ADMIN — POTASSIUM CHLORIDE 10 MEQ: 750 TABLET, FILM COATED, EXTENDED RELEASE ORAL at 14:25

## 2019-09-03 RX ADMIN — ASPIRIN 81 MG 324 MG: 81 TABLET ORAL at 14:26

## 2019-09-03 RX ADMIN — LEVETIRACETAM 500 MG: 100 INJECTION, SOLUTION INTRAVENOUS at 21:30

## 2019-09-03 RX ADMIN — CLOPIDOGREL BISULFATE 75 MG: 75 TABLET ORAL at 14:23

## 2019-09-03 ASSESSMENT — PAIN SCALES - GENERAL: PAINLEVEL_OUTOF10: 0

## 2019-09-04 VITALS
HEART RATE: 94 BPM | RESPIRATION RATE: 16 BRPM | TEMPERATURE: 98 F | BODY MASS INDEX: 55.81 KG/M2 | DIASTOLIC BLOOD PRESSURE: 75 MMHG | SYSTOLIC BLOOD PRESSURE: 123 MMHG | WEIGHT: 315 LBS | OXYGEN SATURATION: 92 % | HEIGHT: 63 IN

## 2019-09-04 LAB
GLUCOSE BLD-MCNC: 130 MG/DL (ref 70–99)
GLUCOSE BLD-MCNC: 142 MG/DL (ref 70–99)
GLUCOSE BLD-MCNC: 153 MG/DL (ref 70–99)
MAGNESIUM: 1.4 MG/DL (ref 1.8–2.4)

## 2019-09-04 PROCEDURE — 6360000002 HC RX W HCPCS: Performed by: HOSPITALIST

## 2019-09-04 PROCEDURE — 6370000000 HC RX 637 (ALT 250 FOR IP): Performed by: HOSPITALIST

## 2019-09-04 PROCEDURE — 36415 COLL VENOUS BLD VENIPUNCTURE: CPT

## 2019-09-04 PROCEDURE — 94761 N-INVAS EAR/PLS OXIMETRY MLT: CPT

## 2019-09-04 PROCEDURE — 6360000002 HC RX W HCPCS: Performed by: CLINICAL NURSE SPECIALIST

## 2019-09-04 PROCEDURE — 83735 ASSAY OF MAGNESIUM: CPT

## 2019-09-04 PROCEDURE — 2580000003 HC RX 258: Performed by: CLINICAL NURSE SPECIALIST

## 2019-09-04 PROCEDURE — 82962 GLUCOSE BLOOD TEST: CPT

## 2019-09-04 RX ORDER — FUROSEMIDE 40 MG/1
40 TABLET ORAL DAILY
Status: DISCONTINUED | OUTPATIENT
Start: 2019-09-05 | End: 2019-09-04 | Stop reason: HOSPADM

## 2019-09-04 RX ORDER — LEVETIRACETAM 500 MG/1
500 TABLET ORAL 2 TIMES DAILY
Qty: 60 TABLET | Refills: 0
Start: 2019-09-04

## 2019-09-04 RX ORDER — MAGNESIUM SULFATE IN WATER 40 MG/ML
2 INJECTION, SOLUTION INTRAVENOUS ONCE
Status: COMPLETED | OUTPATIENT
Start: 2019-09-04 | End: 2019-09-04

## 2019-09-04 RX ADMIN — LEVETIRACETAM 500 MG: 100 INJECTION, SOLUTION INTRAVENOUS at 11:43

## 2019-09-04 RX ADMIN — Medication 1 TABLET: at 10:19

## 2019-09-04 RX ADMIN — TAMSULOSIN HYDROCHLORIDE 0.4 MG: 0.4 CAPSULE ORAL at 10:20

## 2019-09-04 RX ADMIN — OXYBUTYNIN CHLORIDE 5 MG: 5 TABLET ORAL at 16:25

## 2019-09-04 RX ADMIN — CLOPIDOGREL BISULFATE 75 MG: 75 TABLET ORAL at 10:19

## 2019-09-04 RX ADMIN — LEVOTHYROXINE SODIUM 50 MCG: 50 TABLET ORAL at 05:53

## 2019-09-04 RX ADMIN — FLUOXETINE 20 MG: 20 CAPSULE ORAL at 10:19

## 2019-09-04 RX ADMIN — ENOXAPARIN SODIUM 40 MG: 40 INJECTION SUBCUTANEOUS at 10:28

## 2019-09-04 RX ADMIN — DOCUSATE SODIUM 100 MG: 100 CAPSULE, LIQUID FILLED ORAL at 10:20

## 2019-09-04 RX ADMIN — MAGNESIUM OXIDE TAB 400 MG (241.3 MG ELEMENTAL MG) 400 MG: 400 (241.3 MG) TAB at 10:19

## 2019-09-04 RX ADMIN — POTASSIUM CHLORIDE 10 MEQ: 750 TABLET, FILM COATED, EXTENDED RELEASE ORAL at 10:19

## 2019-09-04 RX ADMIN — MAGNESIUM OXIDE TAB 400 MG (241.3 MG ELEMENTAL MG) 400 MG: 400 (241.3 MG) TAB at 16:25

## 2019-09-04 RX ADMIN — MAGNESIUM SULFATE HEPTAHYDRATE 2 G: 40 INJECTION, SOLUTION INTRAVENOUS at 12:50

## 2019-09-04 RX ADMIN — METOPROLOL SUCCINATE 25 MG: 25 TABLET, EXTENDED RELEASE ORAL at 10:19

## 2019-09-04 RX ADMIN — ASPIRIN 81 MG 324 MG: 81 TABLET ORAL at 10:23

## 2019-09-04 RX ADMIN — FAMOTIDINE 20 MG: 20 TABLET ORAL at 10:19

## 2019-09-04 RX ADMIN — OXYBUTYNIN CHLORIDE 5 MG: 5 TABLET ORAL at 10:19

## 2019-09-04 RX ADMIN — LISINOPRIL 10 MG: 10 TABLET ORAL at 10:19

## 2019-09-04 ASSESSMENT — PAIN SCALES - GENERAL: PAINLEVEL_OUTOF10: 0

## 2019-09-04 NOTE — PLAN OF CARE
Problem: Falls - Risk of:  Goal: Will remain free from falls  Description  Will remain free from falls  9/3/2019 2307 by Grazyna Parks RN  Outcome: Ongoing  9/3/2019 0952 by Juan Benavidez LPN  Outcome: Ongoing  Goal: Absence of physical injury  Description  Absence of physical injury  9/3/2019 2307 by Grazyna Parks RN  Outcome: Ongoing  9/3/2019 0952 by Juan Benavidez LPN  Outcome: Ongoing     Problem: Risk for Impaired Skin Integrity  Goal: Tissue integrity - skin and mucous membranes  Description  Structural intactness and normal physiological function of skin and  mucous membranes.   9/3/2019 2307 by Grazyna Parks RN  Outcome: Ongoing  9/3/2019 0952 by Juan Benavidez LPN  Outcome: Ongoing

## 2019-09-04 NOTE — PROGRESS NOTES
Kirsty Cooper MD   25 mg at 09/03/19 1426    nitroGLYCERIN (NITROSTAT) SL tablet 0.4 mg  0.4 mg Sublingual Q5 Min PRN Arnold Ferreira MD        oxybutynin (DITROPAN) tablet 5 mg  5 mg Oral TID Arnold Ferreira MD   5 mg at 09/03/19 2131    potassium chloride (KLOR-CON) extended release tablet 10 mEq  10 mEq Oral BID Arnold Ferreira MD   10 mEq at 09/03/19 2130    tamsulosin (FLOMAX) capsule 0.4 mg  0.4 mg Oral Daily Arnold Ferreira MD   0.4 mg at 09/03/19 1423    sodium chloride flush 0.9 % injection 10 mL  10 mL Intravenous 2 times per day Arnold Ferreira MD   10 mL at 09/03/19 2131    sodium chloride flush 0.9 % injection 10 mL  10 mL Intravenous PRN Arnold Ferreira MD        magnesium hydroxide (MILK OF MAGNESIA) 400 MG/5ML suspension 30 mL  30 mL Oral Daily PRN Arnold Ferreira MD        ondansetron (ZOFRAN) injection 4 mg  4 mg Intravenous Q6H PRN Arnold Ferreira MD        enoxaparin (LOVENOX) injection 40 mg  40 mg Subcutaneous Daily Arnold Ferreira MD   40 mg at 09/03/19 1037    famotidine (PEPCID) tablet 20 mg  20 mg Oral BID Arnold Ferreira MD   20 mg at 09/03/19 2130    glucose (GLUTOSE) 40 % oral gel 15 g  15 g Oral PRN Arnold Ferreira MD        dextrose 50 % IV solution  12.5 g Intravenous PRN Arnold Ferreira MD        glucagon (rDNA) injection 1 mg  1 mg Intramuscular PRN Arnold Ferreira MD        dextrose 5 % solution  100 mL/hr Intravenous PRN Arnold Ferreira MD         Allergies   Allergen Reactions    Latex      Active Problems:    Seizures ([address]  Resolved Problems:    * No resolved hospital problems. *    Blood pressure (!) 100/56, pulse 70, temperature 97.9 °F (36.6 °C), temperature source Oral, resp. rate 17, height 5' 3\" (1.6 m), weight (!) 350 lb (158.8 kg), SpO2 100 %. Subjective:  Symptoms:  Stable. Diet:  Adequate intake. Pain:  He reports no pain. Objective:  General Appearance:  Comfortable.     Vital signs: (most recent): Blood pressure (!) 100/56, pulse 70, temperature 97.9 °F (36.6 °C), temperature source Oral, resp. rate 17, height 5' 3\" (1.6 m), weight (!) 350 lb (158.8 kg), SpO2 100 %. Vital signs are normal.    HEENT: Normal HEENT exam.    Heart: Normal rate. Abdomen: Abdomen is soft. Bowel sounds are normal.     Extremities: Decreased range of motion. Neurological: Patient is alert. Pupils:  Pupils are equal, round, and reactive to light. Skin:  Warm.       Assessment & Plan  Seizure disorder  -low dilantin levels and dilantin stopped  -Keppra bid  -EEG outpt  Acute metabolic encephalopathye(resolved)  -no signs of infection  CAD  -ASA and plavix once more awake  DM  -SSI  Morbid obesity  HTN  -ACE, BB  HYpothyroid  -synhtorid once awake  BPH  -flomax  Hypomagneisa  -replacement  HPL  -statin on hold due to confusino  DVT prophylaxis  -lovneox      Discharge today  Lalito Costa MD  9/4/2019

## 2019-12-05 ENCOUNTER — OFFICE VISIT (OUTPATIENT)
Dept: CARDIOLOGY CLINIC | Age: 54
End: 2019-12-05
Payer: MEDICAID

## 2019-12-05 VITALS
HEART RATE: 65 BPM | DIASTOLIC BLOOD PRESSURE: 60 MMHG | HEIGHT: 65 IN | WEIGHT: 250 LBS | BODY MASS INDEX: 41.65 KG/M2 | SYSTOLIC BLOOD PRESSURE: 90 MMHG

## 2019-12-05 DIAGNOSIS — I25.10 CORONARY ARTERY DISEASE INVOLVING NATIVE CORONARY ARTERY OF NATIVE HEART WITHOUT ANGINA PECTORIS: Primary | ICD-10-CM

## 2019-12-05 PROCEDURE — 99214 OFFICE O/P EST MOD 30 MIN: CPT | Performed by: INTERNAL MEDICINE

## 2019-12-05 RX ORDER — CHOLECALCIFEROL (VITAMIN D3) 125 MCG
5 CAPSULE ORAL NIGHTLY
COMMUNITY

## 2020-02-12 ENCOUNTER — PROCEDURE VISIT (OUTPATIENT)
Dept: CARDIOLOGY CLINIC | Age: 55
End: 2020-02-12
Payer: MEDICAID

## 2020-02-12 PROCEDURE — 93298 REM INTERROG DEV EVAL SCRMS: CPT | Performed by: INTERNAL MEDICINE

## 2020-03-25 PROBLEM — K21.9 GERD (GASTROESOPHAGEAL REFLUX DISEASE): Status: RESOLVED | Noted: 2018-04-25 | Resolved: 2020-03-24

## 2020-05-21 ENCOUNTER — PROCEDURE VISIT (OUTPATIENT)
Dept: CARDIOLOGY CLINIC | Age: 55
End: 2020-05-21
Payer: MEDICAID

## 2020-05-21 PROCEDURE — 93297 REM INTERROG DEV EVAL ICPMS: CPT | Performed by: INTERNAL MEDICINE

## 2020-05-21 PROCEDURE — G2066 INTER DEVC REMOTE 30D: HCPCS | Performed by: INTERNAL MEDICINE

## 2020-11-03 PROBLEM — I63.9 CEREBROVASCULAR ACCIDENT (CVA) (HCC): Status: RESOLVED | Noted: 2020-11-03 | Resolved: 2020-11-03

## 2023-02-07 NOTE — H&P
History and Physical      Name:  Shara Mcmanus /Age/Sex: 1965  (48 y.o. male)   MRN & CSN:  [id number] & [id number] Admission Date/Time: 2019  8:57 PM   Location:  [place] [address] PCP: Migdalia Smith MD       Hospital Day: 1    Assessment and Plan:   Shara Mcmanus is a 48 y.o.  male  who presents with altered mental status    Metabolic encephalopathy secondary to urinary tract infection vs recreational drug use  No signs of Sepsis  Utox +ve for amphetamines  Wbc count 9.6 with left shift  CXR with cardiomegaly and vascular congestion, no PNA  Unable to get CT brain due to body weight  UA with wbc 111 and +ve nitrites  Admit to telemetry  Start Ceftriaxone  Follow urine Culture  Monitor mental status    Morbid obesity with functional quadriplegia  [facility] resident    Seizures   Cont Dilantin and PRN ativan    Hx of stroke 2017  Cont ASA/Plavix/statin    DM with chronic complications and chronic insulin use  Glucose 159  Cont lantus  Hold metformin  Add ISS  Monitor CBG    Diet Carb control   DVT Prophylaxis [x] Lovenox, []  Heparin, [] SCDs, [] No VTE prophylaxis, patient ambulating   GI Prophylaxis [] PPI, [] H2 Blocker, [x] No GI prophylaxis, patient is receiving diet/Tube Feeds   Code Status Full code             History of Present Illness:     Chief Complaint: altered mental status  Shara Mcmanus is a 48 y.o.  male  With hx of ischemic stroke, diabetes mellitus, morbid obesity who was brought to ED from his long term care facility after he was found to be confused and somnolent, at time of encounter patient is asleep, snoring, then he woke while RN was attempting to place a peripheral line, started cursing at her and asking her to stop, he is not answering questions, he is cursing at the staff and my self, denies taking any recreational drugs, denies pain, he then closed his eyes and wouldn't answer anymore questions. He is maintaining his airway and Is vitally stable.  BP at time of encounter is 160/83. Ten point ROS not completed, patient is somnolent and non cooperative    Objective:   No intake or output data in the 24 hours ending 05/18/19 2253   Vitals:   Vitals:    05/18/19 2233   BP: (!) 176/115   Pulse: 65   Resp: 14   Temp:    SpO2: 98%     Physical Exam:    GEN Somnolent obese male  EYES Pupils are equally round. No scleral erythema, discharge, or conjunctivitis. HENT Mucous membranes are moist.   NECK No apparent thyromegaly or masses. RESP Clear to auscultation, no wheezes, rales or rhonchi. CARDIO/VASC S1/S2 auscultated. Regular rate without appreciable murmurs, rubs, or gallops. Peripheral pulses equal bilaterally and palpable. +1 peripheral edema. GI Abdomen is soft without significant tenderness, masses, or guarding. Bowel sounds are normoactive.  Medina catheter in place  HEME/LYMPH No petechiae or ecchymoses. MSK No gross joint deformities. Spontaneous movement of all extremities  SKIN Normal coloration, warm, dry. NEURO Slurred speech  PSYCH Somnolent, unable to assess orietnation    Past Medical History:      Past Medical History:   Diagnosis Date    CAD (coronary artery disease) 3/19/2014    Diabetes mellitus ([address]     New diagnosis 3/19/14    Family history of early CAD     Father-age 42's    H/O cardiovascular stress test 5/9/2014    EF 62% normal study    H/O echocardiogram 7/28/15    EF 55-60%. Mild mitral and tricuspid insuff.  H/O echocardiogram 05/09/2018    EF40-50% mild phtn, , TR    History of cardiac catheterization 3/19/2014    3/2014-LAD 99% prox,LAD 80% mid,CX dominant with OM1 50% prox, PDA 80%, RCA 80% mid-PTCA with 1 stent to LAD prox and 2 stents to LAD mid. CX stent in 2nd sitting as OP-Dr Deric Galvez    History of echocardiogram 3/19/2014    3/2014-Mild LVH. Normal global and regional LVSF. EF 60%.  Mild MR/TR;    Hx of Doppler ultrasound 5/09/14    Peripheral- Normal    Hyperlipemia     Loss of consciousness ([address] 3/18/2014    Multiple lacunar infarcts     chronic-basal ganglia region bilaterally    NSTEMI (non-ST elevated myocardial infarction) [address] 3/19/2014    S/P primary angioplasty with coronary stent 3/19/2014    3/2014-PTCA with 3 stents to LAD;    Seizures ([facility] [address]      PSHX:  has a past surgical history that includes Coronary angioplasty with stent (3/19/2014); Diagnostic Cardiac Cath Lab Procedure (3/19/2014); Percutaneous Transluminal Coronary Angio (04/07/2014); other surgical history (Right, 10/23/15); and Insertable Cardiac Monitor (Left, 11/16/2017). Allergies: Allergies   Allergen Reactions    Latex        FAM HX: family history includes Heart Disease in his father and mother; High Blood Pressure in his brother, brother, brother, father, and mother. Soc HX:   Social History     Socioeconomic History    Marital status: Single     Spouse name: None    Number of children: None    Years of education: None    Highest education level: None   Occupational History    None   Social Needs    Financial resource strain: None    Food insecurity:     Worry: None     Inability: None    Transportation needs:     Medical: None     Non-medical: None   Tobacco Use    Smoking status: Never Smoker    Smokeless tobacco: Current User     Types: Chew    Tobacco comment: rev 8/24/2016.   snuff   Substance and Sexual Activity    Alcohol use: No     Alcohol/week: 0.0 oz     Comment: decaf tea    Drug use: No    Sexual activity: None   Lifestyle    Physical activity:     Days per week: None     Minutes per session: None    Stress: None   Relationships    Social connections:     Talks on phone: None     Gets together: None     Attends [employer] service: None     Active member of club or organization: None     Attends meetings of clubs or organizations: None     Relationship status: None    Intimate partner violence:     Fear of current or ex partner: None     Emotionally abused: None     Physically abused: None     Forced sexual activity: None   Other Topics Concern    None   Social History Narrative    None       Medications:   Home Medications:   Prior to Admission medications    Medication Sig Start Date End Date Taking? Authorizing Provider   gabapentin (NEURONTIN) 100 MG capsule Take 100 mg by mouth 3 times daily. Kasey Hawkins Historical Provider, MD   LORazepam (ATIVAN) 2 MG/ML injection Inject 1 mg into the muscle every 4 hours. .    Historical Provider, MD   insulin lispro (HUMALOG) 100 UNIT/ML injection vial Inject into the skin 2 times daily (before meals)    Historical Provider, MD   b complex vitamins capsule Take 1 capsule by mouth daily    Historical Provider, MD   folic acid (FOLVITE) 1 MG tablet Take 1 mg by mouth daily    Historical Provider, MD   phenytoin (DILANTIN) 100 MG ER capsule Take 300 mg by mouth 2 times daily    Historical Provider, MD   clopidogrel (PLAVIX) 75 MG tablet Take 75 mg by mouth daily    Historical Provider, MD   tamsulosin (FLOMAX) 0.4 MG capsule Take 1 capsule by mouth daily 9/4/18   Stevphen Opitz, DO   aspirin 81 MG chewable tablet Take 4 tablets by mouth daily 8/10/18   Tobey Merlin, MD   insulin aspart (NOVOLOG) 100 UNIT/ML injection vial Inject into the skin 3 times daily (before meals)    Historical Provider, MD   oxybutynin (DITROPAN) 5 MG tablet Take 5 mg by mouth 3 times daily    Historical Provider, MD   metoprolol succinate (TOPROL XL) 25 MG extended release tablet Take 25 mg by mouth daily    Historical Provider, MD   acetaminophen (TYLENOL) 325 MG tablet Take 2 tablets by mouth every 4 hours as needed for Pain  Patient taking differently: Take 500 mg by mouth 3 times daily as needed for Pain  11/27/17   Merary Loyola MD   FLUoxetine (PROZAC) 20 MG capsule Take 1 capsule by mouth daily 11/28/17   Merary Loyola MD   insulin glargine (LANTUS) 100 UNIT/ML injection vial Inject 10 Units into the skin nightly 11/27/17   Merary Loyola MD   folic acid-pyridoxine-cyancobalamin (FOLTX) 1.13-25-2 MG TABS Take Partial Purse String (Intermediate) Text: Given the location of the defect and the characteristics of the surrounding skin an intermediate purse string closure was deemed most appropriate.  Undermining was performed circumfirentially around the surgical defect.  A purse string suture was then placed and tightened. Wound tension only allowed a partial closure of the circular defect.